# Patient Record
Sex: MALE | Race: OTHER | NOT HISPANIC OR LATINO
[De-identification: names, ages, dates, MRNs, and addresses within clinical notes are randomized per-mention and may not be internally consistent; named-entity substitution may affect disease eponyms.]

---

## 2017-03-23 ENCOUNTER — NON-APPOINTMENT (OUTPATIENT)
Age: 62
End: 2017-03-23

## 2017-03-23 ENCOUNTER — APPOINTMENT (OUTPATIENT)
Dept: CARDIOLOGY | Facility: CLINIC | Age: 62
End: 2017-03-23

## 2017-03-23 VITALS — DIASTOLIC BLOOD PRESSURE: 91 MMHG | WEIGHT: 240 LBS | SYSTOLIC BLOOD PRESSURE: 159 MMHG

## 2017-08-03 ENCOUNTER — NON-APPOINTMENT (OUTPATIENT)
Age: 62
End: 2017-08-03

## 2017-08-03 ENCOUNTER — APPOINTMENT (OUTPATIENT)
Dept: CARDIOLOGY | Facility: CLINIC | Age: 62
End: 2017-08-03
Payer: MEDICARE

## 2017-08-03 VITALS — DIASTOLIC BLOOD PRESSURE: 82 MMHG | SYSTOLIC BLOOD PRESSURE: 142 MMHG | OXYGEN SATURATION: 94 % | HEART RATE: 66 BPM

## 2017-08-03 PROCEDURE — 99215 OFFICE O/P EST HI 40 MIN: CPT

## 2017-08-03 PROCEDURE — 93000 ELECTROCARDIOGRAM COMPLETE: CPT

## 2017-12-28 ENCOUNTER — APPOINTMENT (OUTPATIENT)
Dept: CARDIOLOGY | Facility: CLINIC | Age: 62
End: 2017-12-28

## 2018-01-25 ENCOUNTER — NON-APPOINTMENT (OUTPATIENT)
Age: 63
End: 2018-01-25

## 2018-01-25 ENCOUNTER — APPOINTMENT (OUTPATIENT)
Dept: CARDIOLOGY | Facility: CLINIC | Age: 63
End: 2018-01-25
Payer: MEDICARE

## 2018-01-25 VITALS
HEIGHT: 70.5 IN | SYSTOLIC BLOOD PRESSURE: 143 MMHG | DIASTOLIC BLOOD PRESSURE: 83 MMHG | HEART RATE: 70 BPM | BODY MASS INDEX: 33.98 KG/M2 | OXYGEN SATURATION: 97 % | WEIGHT: 240 LBS

## 2018-01-25 PROCEDURE — 93000 ELECTROCARDIOGRAM COMPLETE: CPT

## 2018-01-25 PROCEDURE — 99214 OFFICE O/P EST MOD 30 MIN: CPT

## 2018-02-12 ENCOUNTER — MEDICATION RENEWAL (OUTPATIENT)
Age: 63
End: 2018-02-12

## 2018-02-12 ENCOUNTER — RX RENEWAL (OUTPATIENT)
Age: 63
End: 2018-02-12

## 2018-08-09 ENCOUNTER — MEDICATION RENEWAL (OUTPATIENT)
Age: 63
End: 2018-08-09

## 2018-08-09 ENCOUNTER — NON-APPOINTMENT (OUTPATIENT)
Age: 63
End: 2018-08-09

## 2018-08-09 ENCOUNTER — APPOINTMENT (OUTPATIENT)
Dept: CARDIOLOGY | Facility: CLINIC | Age: 63
End: 2018-08-09
Payer: MEDICARE

## 2018-08-09 VITALS
DIASTOLIC BLOOD PRESSURE: 83 MMHG | BODY MASS INDEX: 36.81 KG/M2 | HEART RATE: 75 BPM | WEIGHT: 260 LBS | OXYGEN SATURATION: 95 % | HEIGHT: 70.5 IN | SYSTOLIC BLOOD PRESSURE: 144 MMHG

## 2018-08-09 PROCEDURE — 93000 ELECTROCARDIOGRAM COMPLETE: CPT

## 2018-08-09 PROCEDURE — 99214 OFFICE O/P EST MOD 30 MIN: CPT

## 2018-11-15 ENCOUNTER — NON-APPOINTMENT (OUTPATIENT)
Age: 63
End: 2018-11-15

## 2018-11-15 ENCOUNTER — APPOINTMENT (OUTPATIENT)
Dept: CARDIOLOGY | Facility: CLINIC | Age: 63
End: 2018-11-15
Payer: MEDICARE

## 2018-11-15 VITALS — DIASTOLIC BLOOD PRESSURE: 91 MMHG | HEIGHT: 70.5 IN | SYSTOLIC BLOOD PRESSURE: 149 MMHG

## 2018-11-15 PROCEDURE — 93000 ELECTROCARDIOGRAM COMPLETE: CPT

## 2018-11-15 PROCEDURE — 99214 OFFICE O/P EST MOD 30 MIN: CPT

## 2018-11-15 RX ORDER — SENNOSIDES 8.6 MG/1
8.6 TABLET ORAL
Qty: 180 | Refills: 0 | Status: ACTIVE | COMMUNITY
Start: 2017-12-28

## 2018-11-15 RX ORDER — DOCUSATE SODIUM 100 MG/1
100 CAPSULE ORAL
Refills: 0 | Status: ACTIVE | COMMUNITY

## 2018-11-15 RX ORDER — MULTIVIT-MIN/FA/LYCOPEN/LUTEIN .4-300-25
TABLET ORAL
Refills: 0 | Status: ACTIVE | COMMUNITY

## 2018-11-15 RX ORDER — DULOXETINE HYDROCHLORIDE 60 MG/1
60 CAPSULE, DELAYED RELEASE PELLETS ORAL
Qty: 60 | Refills: 0 | Status: ACTIVE | COMMUNITY
Start: 2018-07-07

## 2018-11-15 RX ORDER — OXCARBAZEPINE 150 MG/1
150 TABLET, FILM COATED ORAL TWICE DAILY
Refills: 0 | Status: ACTIVE | COMMUNITY
Start: 2018-07-07

## 2018-11-15 RX ORDER — ARIPIPRAZOLE 2 MG/1
2 TABLET ORAL
Refills: 0 | Status: DISCONTINUED | COMMUNITY
Start: 2018-08-09 | End: 2018-11-15

## 2018-11-18 NOTE — REASON FOR VISIT
[Follow-Up - Clinic] : a clinic follow-up of [Coronary Artery Disease] : coronary artery disease [Hyperlipidemia] : hyperlipidemia [Hypertension] : hypertension [Medication Management] : Medication management [Prior Myocardial Infarction] : a prior myocardial infarction [FreeTextEntry1] : prior CVA

## 2018-11-18 NOTE — HISTORY OF PRESENT ILLNESS
[FreeTextEntry1] : Isaak is returning to the office for a follow-up\par Appears more engaged and happy than prior\par Was recently admitted to [facility] for cardiac w/u which was unrevealing.\par \par No chest pain\par No palpitations\par No LE edema\par No falls or blackouts\par \par

## 2018-11-18 NOTE — PHYSICAL EXAM
[General Appearance - Well Developed] : well developed [Normal Appearance] : normal appearance [General Appearance - Well Nourished] : well nourished [General Appearance - In No Acute Distress] : no acute distress [Normal Conjunctiva] : the conjunctiva exhibited no abnormalities [Eyelids - No Xanthelasma] : the eyelids demonstrated no xanthelasmas [Normal Oral Mucosa] : normal oral mucosa [No Oral Pallor] : no oral pallor [No Oral Cyanosis] : no oral cyanosis [Normal Jugular Venous A Waves Present] : normal jugular venous A waves present [Normal Jugular Venous V Waves Present] : normal jugular venous V waves present [No Jugular Venous Carrillo A Waves] : no jugular venous carrillo A waves [Respiration, Rhythm And Depth] : normal respiratory rhythm and effort [Exaggerated Use Of Accessory Muscles For Inspiration] : no accessory muscle use [Auscultation Breath Sounds / Voice Sounds] : lungs were clear to auscultation bilaterally [Heart Rate And Rhythm] : heart rate and rhythm were normal [Heart Sounds] : normal S1 and S2 [Murmurs] : no murmurs present [Abdomen Soft] : soft [Abdomen Tenderness] : non-tender [Abdomen Mass (___ Cm)] : no abdominal mass palpated [Nail Clubbing] : no clubbing of the fingernails [Cyanosis, Localized] : no localized cyanosis [Skin Color & Pigmentation] : normal skin color and pigmentation [] : no rash [No Venous Stasis] : no venous stasis [Skin Lesions] : no skin lesions [No Skin Ulcers] : no skin ulcer [No Xanthoma] : no  xanthoma was observed [Oriented To Time, Place, And Person] : oriented to person, place, and time [No Anxiety] : not feeling anxious [FreeTextEntry1] : Sitting in wheelchair

## 2019-01-10 ENCOUNTER — NON-APPOINTMENT (OUTPATIENT)
Age: 64
End: 2019-01-10

## 2019-01-10 ENCOUNTER — APPOINTMENT (OUTPATIENT)
Dept: CARDIOLOGY | Facility: CLINIC | Age: 64
End: 2019-01-10
Payer: MEDICARE

## 2019-01-10 VITALS — WEIGHT: 230 LBS | DIASTOLIC BLOOD PRESSURE: 76 MMHG | BODY MASS INDEX: 32.54 KG/M2 | SYSTOLIC BLOOD PRESSURE: 126 MMHG

## 2019-01-10 PROCEDURE — 93000 ELECTROCARDIOGRAM COMPLETE: CPT

## 2019-01-10 PROCEDURE — 99214 OFFICE O/P EST MOD 30 MIN: CPT

## 2019-01-13 NOTE — HISTORY OF PRESENT ILLNESS
[FreeTextEntry1] : Isaak is returning to the office for a follow-up\par Recently admitted to [facility] with urosepsis/kidney stone\par Now returns for f/u\par \par No chest pain\par No palpitations\par No LE edema\par No falls or blackouts\par \par

## 2019-05-16 ENCOUNTER — APPOINTMENT (OUTPATIENT)
Dept: CARDIOLOGY | Facility: CLINIC | Age: 64
End: 2019-05-16

## 2019-07-11 ENCOUNTER — NON-APPOINTMENT (OUTPATIENT)
Age: 64
End: 2019-07-11

## 2019-07-11 ENCOUNTER — APPOINTMENT (OUTPATIENT)
Dept: CARDIOLOGY | Facility: CLINIC | Age: 64
End: 2019-07-11
Payer: MEDICARE

## 2019-07-11 VITALS — SYSTOLIC BLOOD PRESSURE: 109 MMHG | OXYGEN SATURATION: 95 % | DIASTOLIC BLOOD PRESSURE: 73 MMHG | HEART RATE: 86 BPM

## 2019-07-11 PROCEDURE — 93000 ELECTROCARDIOGRAM COMPLETE: CPT

## 2019-07-11 PROCEDURE — 99214 OFFICE O/P EST MOD 30 MIN: CPT

## 2019-07-22 NOTE — PHYSICAL EXAM
[General Appearance - Well Developed] : well developed [Normal Appearance] : normal appearance [General Appearance - Well Nourished] : well nourished [General Appearance - In No Acute Distress] : no acute distress [Normal Conjunctiva] : the conjunctiva exhibited no abnormalities [Eyelids - No Xanthelasma] : the eyelids demonstrated no xanthelasmas [Normal Oral Mucosa] : normal oral mucosa [No Oral Pallor] : no oral pallor [No Oral Cyanosis] : no oral cyanosis [Normal Jugular Venous A Waves Present] : normal jugular venous A waves present [Normal Jugular Venous V Waves Present] : normal jugular venous V waves present [No Jugular Venous Carrillo A Waves] : no jugular venous carrillo A waves [Respiration, Rhythm And Depth] : normal respiratory rhythm and effort [Auscultation Breath Sounds / Voice Sounds] : lungs were clear to auscultation bilaterally [Exaggerated Use Of Accessory Muscles For Inspiration] : no accessory muscle use [Heart Rate And Rhythm] : heart rate and rhythm were normal [Heart Sounds] : normal S1 and S2 [Murmurs] : no murmurs present [Abdomen Soft] : soft [Abdomen Tenderness] : non-tender [Abdomen Mass (___ Cm)] : no abdominal mass palpated [Cyanosis, Localized] : no localized cyanosis [Nail Clubbing] : no clubbing of the fingernails [Skin Color & Pigmentation] : normal skin color and pigmentation [] : no rash [No Venous Stasis] : no venous stasis [Skin Lesions] : no skin lesions [No Skin Ulcers] : no skin ulcer [No Xanthoma] : no  xanthoma was observed [Oriented To Time, Place, And Person] : oriented to person, place, and time [No Anxiety] : not feeling anxious [FreeTextEntry1] : Sitting in wheelchair

## 2019-07-22 NOTE — REASON FOR VISIT
[Follow-Up - Clinic] : a clinic follow-up of [Coronary Artery Disease] : coronary artery disease [Hyperlipidemia] : hyperlipidemia [Medication Management] : Medication management [Hypertension] : hypertension [Prior Myocardial Infarction] : a prior myocardial infarction [FreeTextEntry1] : prior CVA

## 2019-07-25 ENCOUNTER — APPOINTMENT (OUTPATIENT)
Dept: CV DIAGNOSTICS | Facility: HOSPITAL | Age: 64
End: 2019-07-25

## 2019-08-05 ENCOUNTER — APPOINTMENT (OUTPATIENT)
Dept: CV DIAGNOSTICS | Facility: HOSPITAL | Age: 64
End: 2019-08-05

## 2019-08-05 ENCOUNTER — OUTPATIENT (OUTPATIENT)
Dept: OUTPATIENT SERVICES | Facility: HOSPITAL | Age: 64
LOS: 1 days | End: 2019-08-05
Payer: MEDICARE

## 2019-08-05 DIAGNOSIS — I25.10 ATHEROSCLEROTIC HEART DISEASE OF NATIVE CORONARY ARTERY WITHOUT ANGINA PECTORIS: ICD-10-CM

## 2019-08-05 PROCEDURE — 78452 HT MUSCLE IMAGE SPECT MULT: CPT

## 2019-08-05 PROCEDURE — A9500: CPT

## 2019-08-05 PROCEDURE — 93018 CV STRESS TEST I&R ONLY: CPT

## 2019-08-05 PROCEDURE — 93016 CV STRESS TEST SUPVJ ONLY: CPT

## 2019-08-05 PROCEDURE — 93017 CV STRESS TEST TRACING ONLY: CPT

## 2019-08-05 PROCEDURE — 78452 HT MUSCLE IMAGE SPECT MULT: CPT | Mod: 26

## 2019-08-16 ENCOUNTER — INPATIENT (INPATIENT)
Facility: HOSPITAL | Age: 64
LOS: 9 days | Discharge: HOME | DRG: 286 | End: 2019-08-26
Attending: INTERNAL MEDICINE | Admitting: INTERNAL MEDICINE
Payer: MEDICARE

## 2019-08-16 VITALS
OXYGEN SATURATION: 100 % | SYSTOLIC BLOOD PRESSURE: 117 MMHG | DIASTOLIC BLOOD PRESSURE: 70 MMHG | HEART RATE: 74 BPM | WEIGHT: 199.96 LBS | TEMPERATURE: 98 F | HEIGHT: 71 IN | RESPIRATION RATE: 18 BRPM

## 2019-08-16 DIAGNOSIS — I21.4 NON-ST ELEVATION (NSTEMI) MYOCARDIAL INFARCTION: ICD-10-CM

## 2019-08-16 LAB — GLUCOSE BLDC GLUCOMTR-MCNC: 110 MG/DL — HIGH (ref 70–99)

## 2019-08-16 PROCEDURE — 71045 X-RAY EXAM CHEST 1 VIEW: CPT | Mod: 26

## 2019-08-16 PROCEDURE — 93010 ELECTROCARDIOGRAM REPORT: CPT

## 2019-08-16 RX ORDER — DOCUSATE SODIUM 100 MG
100 CAPSULE ORAL DAILY
Refills: 0 | Status: DISCONTINUED | OUTPATIENT
Start: 2019-08-16 | End: 2019-08-26

## 2019-08-16 RX ORDER — LISINOPRIL 2.5 MG/1
5 TABLET ORAL DAILY
Refills: 0 | Status: DISCONTINUED | OUTPATIENT
Start: 2019-08-16 | End: 2019-08-17

## 2019-08-16 RX ORDER — DULOXETINE HYDROCHLORIDE 30 MG/1
2 CAPSULE, DELAYED RELEASE ORAL
Qty: 0 | Refills: 0 | DISCHARGE

## 2019-08-16 RX ORDER — MULTIVIT-MIN/FERROUS GLUCONATE 9 MG/15 ML
1 LIQUID (ML) ORAL DAILY
Refills: 0 | Status: DISCONTINUED | OUTPATIENT
Start: 2019-08-16 | End: 2019-08-26

## 2019-08-16 RX ORDER — DOCUSATE SODIUM 100 MG
3 CAPSULE ORAL
Qty: 0 | Refills: 0 | DISCHARGE

## 2019-08-16 RX ORDER — CLONAZEPAM 1 MG
2 TABLET ORAL AT BEDTIME
Refills: 0 | Status: DISCONTINUED | OUTPATIENT
Start: 2019-08-16 | End: 2019-08-23

## 2019-08-16 RX ORDER — CLOPIDOGREL BISULFATE 75 MG/1
75 TABLET, FILM COATED ORAL DAILY
Refills: 0 | Status: DISCONTINUED | OUTPATIENT
Start: 2019-08-16 | End: 2019-08-26

## 2019-08-16 RX ORDER — SENNA PLUS 8.6 MG/1
1 TABLET ORAL AT BEDTIME
Refills: 0 | Status: DISCONTINUED | OUTPATIENT
Start: 2019-08-16 | End: 2019-08-26

## 2019-08-16 RX ORDER — BUPROPION HYDROCHLORIDE 150 MG/1
1 TABLET, EXTENDED RELEASE ORAL
Qty: 0 | Refills: 0 | DISCHARGE

## 2019-08-16 RX ORDER — ATORVASTATIN CALCIUM 80 MG/1
80 TABLET, FILM COATED ORAL AT BEDTIME
Refills: 0 | Status: DISCONTINUED | OUTPATIENT
Start: 2019-08-16 | End: 2019-08-26

## 2019-08-16 RX ORDER — ARIPIPRAZOLE 15 MG/1
5 TABLET ORAL DAILY
Refills: 0 | Status: DISCONTINUED | OUTPATIENT
Start: 2019-08-16 | End: 2019-08-26

## 2019-08-16 RX ORDER — OXCARBAZEPINE 300 MG/1
150 TABLET, FILM COATED ORAL
Refills: 0 | Status: DISCONTINUED | OUTPATIENT
Start: 2019-08-16 | End: 2019-08-26

## 2019-08-16 RX ORDER — SODIUM CHLORIDE 9 MG/ML
3 INJECTION INTRAMUSCULAR; INTRAVENOUS; SUBCUTANEOUS EVERY 8 HOURS
Refills: 0 | Status: DISCONTINUED | OUTPATIENT
Start: 2019-08-16 | End: 2019-08-26

## 2019-08-16 RX ORDER — ASPIRIN/CALCIUM CARB/MAGNESIUM 324 MG
81 TABLET ORAL DAILY
Refills: 0 | Status: DISCONTINUED | OUTPATIENT
Start: 2019-08-16 | End: 2019-08-21

## 2019-08-16 RX ORDER — AMLODIPINE BESYLATE 2.5 MG/1
5 TABLET ORAL DAILY
Refills: 0 | Status: DISCONTINUED | OUTPATIENT
Start: 2019-08-16 | End: 2019-08-17

## 2019-08-16 RX ORDER — OXCARBAZEPINE 300 MG/1
1 TABLET, FILM COATED ORAL
Qty: 0 | Refills: 0 | DISCHARGE

## 2019-08-16 RX ORDER — TAMSULOSIN HYDROCHLORIDE 0.4 MG/1
1 CAPSULE ORAL
Qty: 0 | Refills: 0 | DISCHARGE

## 2019-08-16 RX ORDER — SENNA PLUS 8.6 MG/1
1 TABLET ORAL
Qty: 0 | Refills: 0 | DISCHARGE

## 2019-08-16 RX ORDER — TAMSULOSIN HYDROCHLORIDE 0.4 MG/1
0.4 CAPSULE ORAL AT BEDTIME
Refills: 0 | Status: DISCONTINUED | OUTPATIENT
Start: 2019-08-16 | End: 2019-08-26

## 2019-08-16 RX ORDER — METOPROLOL TARTRATE 50 MG
50 TABLET ORAL
Refills: 0 | Status: DISCONTINUED | OUTPATIENT
Start: 2019-08-16 | End: 2019-08-18

## 2019-08-16 RX ORDER — FINASTERIDE 5 MG/1
1 TABLET, FILM COATED ORAL
Qty: 0 | Refills: 0 | DISCHARGE

## 2019-08-16 RX ORDER — FAMOTIDINE 10 MG/ML
40 INJECTION INTRAVENOUS DAILY
Refills: 0 | Status: DISCONTINUED | OUTPATIENT
Start: 2019-08-16 | End: 2019-08-26

## 2019-08-16 RX ORDER — ARIPIPRAZOLE 15 MG/1
1 TABLET ORAL
Qty: 0 | Refills: 0 | DISCHARGE

## 2019-08-16 RX ORDER — DULOXETINE HYDROCHLORIDE 30 MG/1
60 CAPSULE, DELAYED RELEASE ORAL DAILY
Refills: 0 | Status: DISCONTINUED | OUTPATIENT
Start: 2019-08-16 | End: 2019-08-26

## 2019-08-16 RX ORDER — FINASTERIDE 5 MG/1
5 TABLET, FILM COATED ORAL DAILY
Refills: 0 | Status: DISCONTINUED | OUTPATIENT
Start: 2019-08-16 | End: 2019-08-26

## 2019-08-16 RX ORDER — BUPROPION HYDROCHLORIDE 150 MG/1
300 TABLET, EXTENDED RELEASE ORAL DAILY
Refills: 0 | Status: DISCONTINUED | OUTPATIENT
Start: 2019-08-16 | End: 2019-08-26

## 2019-08-16 RX ADMIN — ATORVASTATIN CALCIUM 80 MILLIGRAM(S): 80 TABLET, FILM COATED ORAL at 21:45

## 2019-08-16 RX ADMIN — OXCARBAZEPINE 150 MILLIGRAM(S): 300 TABLET, FILM COATED ORAL at 23:02

## 2019-08-16 RX ADMIN — SENNA PLUS 1 TABLET(S): 8.6 TABLET ORAL at 21:44

## 2019-08-16 RX ADMIN — Medication 2 MILLIGRAM(S): at 22:54

## 2019-08-16 RX ADMIN — Medication 50 MILLIGRAM(S): at 21:44

## 2019-08-16 RX ADMIN — TAMSULOSIN HYDROCHLORIDE 0.4 MILLIGRAM(S): 0.4 CAPSULE ORAL at 21:45

## 2019-08-16 RX ADMIN — SODIUM CHLORIDE 3 MILLILITER(S): 9 INJECTION INTRAMUSCULAR; INTRAVENOUS; SUBCUTANEOUS at 22:00

## 2019-08-16 NOTE — H&P CARDIOLOGY - FAMILY HISTORY
Sibling  Still living? Yes, Estimated age: Age Unknown  Family history of pacemaker, Age at diagnosis: Age Unknown  Atrial fibrillation, Age at diagnosis: Age Unknown

## 2019-08-16 NOTE — CHART NOTE - NSCHARTNOTEFT_GEN_A_CORE
62 yo m significant Cardiac Hx  CHF exacerbation, UTI/PNA admitted on 8/8 [facility]. Tx'ed for cath. Trop 0.36-0.38 BNP>10K. A course of ABX completed 8/15, appear euvolemic.   EF 10% by Echo, by stress 8/5/19 ([facility]) was 14% (seen by Dr. Padron), Cath Cx'ed 2/2 no significant stress test changes as per Dr. Becker.  + stage 3-4 sacral decubitus f/u with wound clinic at [place].   Chronic Pro cath switched at [place] by urology team    Admitted to tele (Dr. Moran as per Dr. Becker)  Card Consult team, Heart failure team, PT/wound care team to follow up.   Continue current medication: ASA, Plavix, Statin, Metoprolol  Consider restart diuretics  Repeat Echo if needed 62 yo m significant Cardiac Hx  CHF exacerbation, UTI/PNA admitted on 8/8 [facility]. Tx'ed for cath. Trop 0.36-0.38 BNP>10K. A course of ABX completed 8/15, appear euvolemic.   EF 10% by Echo, by stress 8/5/19 ([facility]) was 14% (seen by Dr. Padron), Cath Cx'ed 2/2 no significant stress test changes as per Dr. Becker.  + stage 3-4 sacral decubitus f/u with wound clinic at [place].   Chronic Pro cath switched at [place] by urology team    Admitted to tele (Dr. Moran as per Dr. Becker)  Card Consult team, Heart failure team, PT/wound care team to follow up.   Continue current medication: ASA, Plavix, Statin, Metoprolol  Consider restart diuretics  Repeat Echo if needed  Urology consult

## 2019-08-16 NOTE — H&P CARDIOLOGY - PMH
CAD (Coronary Artery Disease)    Congestive heart failure    CVA (Cerebral Infarction)    Depression    Diabetes    Hypercholesteremia    Hypertension    Myocardial Infarction    Obesity    Sacral decubitus ulcer, stage III  to IV Benign prostatic hypertrophy    CAD (Coronary Artery Disease)    Congestive heart failure    CVA (Cerebral Infarction)    Depression    Diabetes    Hypercholesteremia    Hypertension    Myocardial Infarction    Obesity    Sacral decubitus ulcer, stage III  to IV

## 2019-08-16 NOTE — H&P CARDIOLOGY - ADDITIONAL PE
sacral stage 3-4 decubitus size 3x5 cm with depth 2cm, appearing tunnel on the bottom mild green drainage noted.

## 2019-08-16 NOTE — H&P CARDIOLOGY - HISTORY OF PRESENT ILLNESS
63 year old  male no implantable device  with PMHx: HTN, DMT2, CAD, MI followed by CABG X 4 (2008), Stent x 2 in 2011, CVA with right side weakness and tremors, HTN, HLD, stage IV      vessels 3 years ago complicated by  CVA  that required tracheostomy, presents to [facility] [place] with 2 days history of back and chest pain 4/10 at rest midsternal area, + diaphoresis. Patient ruled in for NSTEMI, trop 20.5,  started on heparin gtt and tridal gtt and transferred here for cardiac catheterization,?PCI. 63 year old  male no implantable device with PMHx: HTN, DMT2 (long time ago as pre daughter, not on meds), CAD, MI followed by CABG X 4 (2007), Stent x 2 in 2011, CVA with right side weakness and tremors, HTN, HLD, stage IV sacral decubitus presented to [facility] on 8/8/2019 c/o SOB, no chest pain, no dizziness or syncopal episode. Pt was placed on BIPAP and now on 3L/NC.   Pt usually walks with walker in house (wheel chair and mobilized wheel chair but incontinent to urine and stool, Pro cath for past few months f/u urology clinic at [place]). Pt had mild elevation of Troponin 0.38 from 0.36. Treated for CHF exacerbation and possible pneumonia with a course of ABX.    EF was decreased to 10% now from 15% as per pt's daughter.     [facility] Urology clinic: [phone],   PCP  Sergey Wassem , [phone], Psychiatrist Dr. Shara Muse , [phone] (fax) 63 year old  male no implantable device with PMHx: HTN, DMT2 (long time ago as pre daughter, not on meds), CAD, MI followed by CABG X 4 (2007), Stent x 2 in 2011, CVA with right side weakness and tremors, HTN, HLD, stage III- IV sacral decubitus presented to [facility] on 8/8/2019 c/o SOB, no chest pain, no dizziness or syncopal episode. CXR showed pulm congestion with prBNP 10k, WBC 18, was placed on BIPAP admitted to CCU for CHF excerbation, diuresed, and now on 3L/NC. Had mild elevation of Troponin 0.38 from 0.36. On admission pt had bilateral crackles with JVD as per record. Possible PNA & UA positive for 3 organism, a course of ABX (Rocephin and Zithromax) completed on 8/15.   EF 10% on this admission compare to last EF 14% by stress test. Pro cath changed on 8/12 by urologist, Urine C/s positive for E coli, Proteus Mirabillis, sensitive to ceftriaxone. Repeat urine C/s pending as per note.   Having decrease EF with mild elevation of Troponin, pt is now transferred here for cardiac cath and further treatment options.     Pt usually walks with walker in house (wheel chair and mobilized wheel chair but incontinent to urine and stool, Por cath for past few months f/u urology clinic at [place]).     Echo on 8/8/2019: mod dilated LV size, normal RV systolic function, mod dilated left atrium EF 10%.     [facility] Urology clinic: [phone],   PCP  Sergey Wassem , [phone], Psychiatrist Dr. Shara Muse , [phone] (fax)  Labs on  8/15 WBC 5.59, H/H 10/33, plt 207, , K 4.4, chl 103, CO2 28, BUN 23, Cr 1.25 63 year old  male no implantable device with PMHx: HTN, DMT2 (long time ago as pre daughter, not on meds), CAD, MI followed by CABG X 4 (2007), Stent x 2 in 2011, CVA with right side weakness and tremors, HTN, HLD, stage III- IV sacral decubitus presented to [facility] on 8/8/2019 c/o SOB, no chest pain, no dizziness or syncopal episode. CXR showed pulm congestion with prBNP 10k, WBC 18, was placed on BIPAP admitted to CCU for CHF excerbation, diuresed, and now on 3L/NC. Had mild elevation of Troponin 0.38 from 0.36. On admission pt had bilateral crackles with JVD as per record. Possible PNA & UA positive for 3 organism, a course of ABX (Rocephin and Zithromax) completed on 8/15.   EF 10% on this admission compare to last EF 14% by stress test. Pro cath changed on 8/12 by urologist, Urine C/s positive for E coli, Proteus Mirabillis, sensitive to ceftriaxone. Repeat urine C/s pending as per note.   Having decrease EF with mild elevation of Troponin, pt is now transferred here for cardiac cath and further treatment options.   Last stress test on 8/5/2019:   < from: Nuclear Stress Test-Pharmacologic (08.05.19 @ 17:04) >  * Baseline ECG: There were up to 1 mm downsloping ST segment depressions and T wave inversions in leads II, III, aVF, and V4-V6 at baseline.  * ECG Changes: Baseline ECG abnormalities persisted during rest, stress, and recovery. * Arrhythmia: Occasional VPDs occurred during rest, stress and recovery.  * The left ventricle was enlarged. There are large, moderate to severe defects in the anterior, anterolateral, basal and distal anteroseptal, and apical walls that are mostly fixed suggestive of infarct with mild paulo-infarct ischemia.  * There are large, severe defects in the inferior, inferolateral, and basal and distal inferoseptal walls that are mostly fixed suggestive of infarct with mild paulo-infarct ischemia. * Post-stress gated wall motion analysis was performed (LVEF = 14 %;LVEDV = 346 ml.) Severe global LV systolic dysfunction.   < end of copied text >  Pt usually walks with walker in house (wheel chair and mobilized wheel chair but incontinent to urine and stool, Pro cath for past few months f/u urology clinic at [place]).     Echo on 8/8/2019: mod dilated LV size, normal RV systolic function, mod dilated left atrium EF 10%.     [facility] Urology clinic: [phone],   PCP  Sergey Wassem , [phone], Psychiatrist Dr. Shara Muse , [phone] (fax)  Labs on  8/15 WBC 5.59, H/H 10/33, plt 207, , K 4.4, chl 103, CO2 28, BUN 23, Cr 1.25 63 year old  male no implantable device with PMHx: HTN, DMT2 (long time ago as pre daughter, not on meds, last A1C in 2016 was 5.6), CAD, MI followed by CABG X 4 (2007), Stent x 2 in 2011, CVA with right side weakness and tremors, HTN, HLD, stage III- IV sacral decubitus presented to [facility] on 8/8/2019 c/o SOB, no chest pain, no dizziness or syncopal episode. CXR showed pulm congestion with prBNP 10k, WBC 18, was placed on BIPAP admitted to CCU for CHF exacerbation diuresed, and now on 3L/NC. Had mild elevation of Troponin 0.38 from 0.36. On admission PE, pt had bilateral crackles with JVD as per record. For possible pnemonia & UTI (positive for 3 organism), a course of ABX (Rocephin and Zithromax) completed on 8/15. EF 10% on this admission compare to last EF 14% by stress test on 8/5/2019 at [facility].  Pro cath changed on 8/12 by urologist, Urine C/s resulted positive for E coli, Proteus Mirabillis, sensitive to ceftriaxone. Repeat urine C/s pending as per note. Having decrease EF with mild elevation of Troponin, pt is now transferred here for cardiac cath and further treatment options.     Last stress test on 8/5/2019, last seen by Dr. Padron was 7/11/2019.    < from: Nuclear Stress Test-Pharmacologic (08.05.19 @ 17:04) >  * Baseline ECG: There were up to 1 mm downsloping ST segment depressions and T wave inversions in leads II, III, aVF, and V4-V6 at baseline.  * ECG Changes: Baseline ECG abnormalities persisted during rest, stress, and recovery. * Arrhythmia: Occasional VPDs occurred during rest, stress and recovery.  * The left ventricle was enlarged. There are large, moderate to severe defects in the anterior, anterolateral, basal and distal anteroseptal, and apical walls that are mostly fixed suggestive of infarct with mild paulo-infarct ischemia.  * There are large, severe defects in the inferior, inferolateral, and basal and distal inferoseptal walls that are mostly fixed suggestive of infarct with mild paulo-infarct ischemia. * Post-stress gated wall motion analysis was performed (LVEF = 14 %;LVEDV = 346 ml.) Severe global LV systolic dysfunction.   < end of copied text >  Pt usually walks with walker in house (wheel chair and mobilized wheel chair but incontinent to urine and stool, Pro cath for past few months f/u urology clinic at [place]).     Echo on 8/8/2019: mod dilated LV size, normal RV systolic function, mod dilated left atrium EF 10%.     [facility] Urology clinic: [phone],   PCP visiting doctor Sergey Flores , [phone], Psychiatrist Dr. Shara Muse , [phone] (fax)  Labs on  8/15 WBC 5.59, H/H 10/33, plt 207, , K 4.4, chl 103, CO2 28, BUN 23, Cr 1.25 63 year old  male no implantable device with PMHx: HTN, DMT2 (not on meds, last A1C in 2016 was 5.6), CAD, MI followed by CABG X 4 (2007), Stent x 2 in 2011, CVA with right side weakness and tremors, HTN, HLD, stage III- IV sacral decubitus presented to [facility] on 8/8/2019 c/o SOB, no chest pain, no dizziness or syncopal episode. CXR showed pulm congestion with prBNP 10k, WBC 18, was placed on BIPAP admitted to CCU for CHF exacerbation diuresed, and now on 3L/NC. Had mild elevation of Troponin 0.38 from 0.36. On admission PE, pt had bilateral crackles with JVD as per record. For possible pnemonia & UTI (positive for 3 organism), a course of ABX (Rocephin and Zithromax) completed on 8/15. EF 10% on this admission compare to last EF 14% by stress test on 8/5/2019 at [facility].  Pro cath changed on 8/12 by urologist, Urine C/s resulted positive for E coli, Proteus Mirabillis, sensitive to ceftriaxone. Repeat urine C/s pending as per note. Having decrease EF with mild elevation of Troponin, pt is now transferred here for cardiac cath and further treatment options.     Last stress test on 8/5/2019, last seen by Dr. Padron was 7/11/2019.    < from: Nuclear Stress Test-Pharmacologic (08.05.19 @ 17:04) >  * Baseline ECG: There were up to 1 mm downsloping ST segment depressions and T wave inversions in leads II, III, aVF, and V4-V6 at baseline.  * ECG Changes: Baseline ECG abnormalities persisted during rest, stress, and recovery. * Arrhythmia: Occasional VPDs occurred during rest, stress and recovery.  * The left ventricle was enlarged. There are large, moderate to severe defects in the anterior, anterolateral, basal and distal anteroseptal, and apical walls that are mostly fixed suggestive of infarct with mild paulo-infarct ischemia.  * There are large, severe defects in the inferior, inferolateral, and basal and distal inferoseptal walls that are mostly fixed suggestive of infarct with mild paulo-infarct ischemia. * Post-stress gated wall motion analysis was performed (LVEF = 14 %;LVEDV = 346 ml.) Severe global LV systolic dysfunction.   < end of copied text >  Pt usually walks with walker in house (wheel chair and mobilized wheel chair but incontinent to urine and stool, Pro cath for past few months f/u urology clinic at [place]).     Echo on 8/8/2019: mod dilated LV size, normal RV systolic function, mod dilated left atrium EF 10%.     [facility] Urology clinic: [phone],   PCP visiting doctor Sergey Flores , [phone], Psychiatrist Dr. Shara Muse , [phone] (fax)  Labs on  8/15 WBC 5.59, H/H 10/33, plt 207, , K 4.4, chl 103, CO2 28, BUN 23, Cr 1.25

## 2019-08-17 DIAGNOSIS — I63.9 CEREBRAL INFARCTION, UNSPECIFIED: ICD-10-CM

## 2019-08-17 DIAGNOSIS — I50.23 ACUTE ON CHRONIC SYSTOLIC (CONGESTIVE) HEART FAILURE: ICD-10-CM

## 2019-08-17 DIAGNOSIS — I25.10 ATHEROSCLEROTIC HEART DISEASE OF NATIVE CORONARY ARTERY WITHOUT ANGINA PECTORIS: ICD-10-CM

## 2019-08-17 DIAGNOSIS — E78.00 PURE HYPERCHOLESTEROLEMIA, UNSPECIFIED: ICD-10-CM

## 2019-08-17 DIAGNOSIS — R33.9 RETENTION OF URINE, UNSPECIFIED: ICD-10-CM

## 2019-08-17 DIAGNOSIS — F32.9 MAJOR DEPRESSIVE DISORDER, SINGLE EPISODE, UNSPECIFIED: ICD-10-CM

## 2019-08-17 DIAGNOSIS — I10 ESSENTIAL (PRIMARY) HYPERTENSION: ICD-10-CM

## 2019-08-17 DIAGNOSIS — L89.153 PRESSURE ULCER OF SACRAL REGION, STAGE 3: ICD-10-CM

## 2019-08-17 LAB
ANION GAP SERPL CALC-SCNC: 9 MMOL/L — SIGNIFICANT CHANGE UP (ref 5–17)
BUN SERPL-MCNC: 20 MG/DL — SIGNIFICANT CHANGE UP (ref 7–23)
CALCIUM SERPL-MCNC: 9 MG/DL — SIGNIFICANT CHANGE UP (ref 8.4–10.5)
CHLORIDE SERPL-SCNC: 104 MMOL/L — SIGNIFICANT CHANGE UP (ref 96–108)
CO2 SERPL-SCNC: 26 MMOL/L — SIGNIFICANT CHANGE UP (ref 22–31)
CREAT SERPL-MCNC: 1.29 MG/DL — SIGNIFICANT CHANGE UP (ref 0.5–1.3)
GLUCOSE BLDC GLUCOMTR-MCNC: 78 MG/DL — SIGNIFICANT CHANGE UP (ref 70–99)
GLUCOSE SERPL-MCNC: 83 MG/DL — SIGNIFICANT CHANGE UP (ref 70–99)
HBA1C BLD-MCNC: 5.2 % — SIGNIFICANT CHANGE UP (ref 4–5.6)
HCT VFR BLD CALC: 37.7 % — LOW (ref 39–50)
HGB BLD-MCNC: 11 G/DL — LOW (ref 13–17)
MAGNESIUM SERPL-MCNC: 1.9 MG/DL — SIGNIFICANT CHANGE UP (ref 1.6–2.6)
MCHC RBC-ENTMCNC: 26 PG — LOW (ref 27–34)
MCHC RBC-ENTMCNC: 29.2 GM/DL — LOW (ref 32–36)
MCV RBC AUTO: 89.1 FL — SIGNIFICANT CHANGE UP (ref 80–100)
NT-PROBNP SERPL-SCNC: 2249 PG/ML — HIGH (ref 0–300)
PLATELET # BLD AUTO: 228 K/UL — SIGNIFICANT CHANGE UP (ref 150–400)
POTASSIUM SERPL-MCNC: 4.2 MMOL/L — SIGNIFICANT CHANGE UP (ref 3.5–5.3)
POTASSIUM SERPL-SCNC: 4.2 MMOL/L — SIGNIFICANT CHANGE UP (ref 3.5–5.3)
RBC # BLD: 4.23 M/UL — SIGNIFICANT CHANGE UP (ref 4.2–5.8)
RBC # FLD: 16.6 % — HIGH (ref 10.3–14.5)
SODIUM SERPL-SCNC: 139 MMOL/L — SIGNIFICANT CHANGE UP (ref 135–145)
WBC # BLD: 8.49 K/UL — SIGNIFICANT CHANGE UP (ref 3.8–10.5)
WBC # FLD AUTO: 8.49 K/UL — SIGNIFICANT CHANGE UP (ref 3.8–10.5)

## 2019-08-17 PROCEDURE — 99223 1ST HOSP IP/OBS HIGH 75: CPT | Mod: GC

## 2019-08-17 PROCEDURE — 93010 ELECTROCARDIOGRAM REPORT: CPT

## 2019-08-17 RX ORDER — METOPROLOL TARTRATE 50 MG
100 TABLET ORAL DAILY
Refills: 0 | Status: DISCONTINUED | OUTPATIENT
Start: 2019-08-18 | End: 2019-08-18

## 2019-08-17 RX ORDER — NYSTATIN CREAM 100000 [USP'U]/G
1 CREAM TOPICAL
Refills: 0 | Status: DISCONTINUED | OUTPATIENT
Start: 2019-08-17 | End: 2019-08-26

## 2019-08-17 RX ORDER — FUROSEMIDE 40 MG
20 TABLET ORAL ONCE
Refills: 0 | Status: COMPLETED | OUTPATIENT
Start: 2019-08-17 | End: 2019-08-17

## 2019-08-17 RX ORDER — ASCORBIC ACID 60 MG
500 TABLET,CHEWABLE ORAL DAILY
Refills: 0 | Status: DISCONTINUED | OUTPATIENT
Start: 2019-08-17 | End: 2019-08-26

## 2019-08-17 RX ORDER — ZINC SULFATE TAB 220 MG (50 MG ZINC EQUIVALENT) 220 (50 ZN) MG
220 TAB ORAL ONCE
Refills: 0 | Status: COMPLETED | OUTPATIENT
Start: 2019-08-17 | End: 2019-08-17

## 2019-08-17 RX ORDER — LOSARTAN POTASSIUM 100 MG/1
25 TABLET, FILM COATED ORAL DAILY
Refills: 0 | Status: DISCONTINUED | OUTPATIENT
Start: 2019-08-18 | End: 2019-08-21

## 2019-08-17 RX ADMIN — Medication 100 MILLIGRAM(S): at 11:53

## 2019-08-17 RX ADMIN — SODIUM CHLORIDE 3 MILLILITER(S): 9 INJECTION INTRAMUSCULAR; INTRAVENOUS; SUBCUTANEOUS at 12:01

## 2019-08-17 RX ADMIN — LISINOPRIL 5 MILLIGRAM(S): 2.5 TABLET ORAL at 06:01

## 2019-08-17 RX ADMIN — Medication 500 MILLIGRAM(S): at 11:56

## 2019-08-17 RX ADMIN — NYSTATIN CREAM 1 APPLICATION(S): 100000 CREAM TOPICAL at 18:15

## 2019-08-17 RX ADMIN — AMLODIPINE BESYLATE 5 MILLIGRAM(S): 2.5 TABLET ORAL at 05:09

## 2019-08-17 RX ADMIN — FAMOTIDINE 40 MILLIGRAM(S): 10 INJECTION INTRAVENOUS at 11:54

## 2019-08-17 RX ADMIN — Medication 1 TABLET(S): at 11:53

## 2019-08-17 RX ADMIN — SENNA PLUS 1 TABLET(S): 8.6 TABLET ORAL at 20:50

## 2019-08-17 RX ADMIN — BUPROPION HYDROCHLORIDE 300 MILLIGRAM(S): 150 TABLET, EXTENDED RELEASE ORAL at 11:53

## 2019-08-17 RX ADMIN — ATORVASTATIN CALCIUM 80 MILLIGRAM(S): 80 TABLET, FILM COATED ORAL at 20:50

## 2019-08-17 RX ADMIN — Medication 2 MILLIGRAM(S): at 20:50

## 2019-08-17 RX ADMIN — OXCARBAZEPINE 150 MILLIGRAM(S): 300 TABLET, FILM COATED ORAL at 06:01

## 2019-08-17 RX ADMIN — OXCARBAZEPINE 150 MILLIGRAM(S): 300 TABLET, FILM COATED ORAL at 18:15

## 2019-08-17 RX ADMIN — Medication 50 MILLIGRAM(S): at 06:01

## 2019-08-17 RX ADMIN — TAMSULOSIN HYDROCHLORIDE 0.4 MILLIGRAM(S): 0.4 CAPSULE ORAL at 20:50

## 2019-08-17 RX ADMIN — SODIUM CHLORIDE 3 MILLILITER(S): 9 INJECTION INTRAMUSCULAR; INTRAVENOUS; SUBCUTANEOUS at 05:10

## 2019-08-17 RX ADMIN — DULOXETINE HYDROCHLORIDE 60 MILLIGRAM(S): 30 CAPSULE, DELAYED RELEASE ORAL at 11:53

## 2019-08-17 RX ADMIN — ARIPIPRAZOLE 5 MILLIGRAM(S): 15 TABLET ORAL at 11:53

## 2019-08-17 RX ADMIN — CLOPIDOGREL BISULFATE 75 MILLIGRAM(S): 75 TABLET, FILM COATED ORAL at 05:07

## 2019-08-17 RX ADMIN — Medication 50 MILLIGRAM(S): at 18:15

## 2019-08-17 RX ADMIN — SODIUM CHLORIDE 3 MILLILITER(S): 9 INJECTION INTRAMUSCULAR; INTRAVENOUS; SUBCUTANEOUS at 20:53

## 2019-08-17 RX ADMIN — Medication 20 MILLIGRAM(S): at 10:06

## 2019-08-17 RX ADMIN — FINASTERIDE 5 MILLIGRAM(S): 5 TABLET, FILM COATED ORAL at 11:54

## 2019-08-17 RX ADMIN — ZINC SULFATE TAB 220 MG (50 MG ZINC EQUIVALENT) 220 MILLIGRAM(S): 220 (50 ZN) TAB at 11:57

## 2019-08-17 RX ADMIN — Medication 81 MILLIGRAM(S): at 11:52

## 2019-08-17 NOTE — CONSULT NOTE ADULT - SUBJECTIVE AND OBJECTIVE BOX
Patient is a 63y old  Male who presents with a chief complaint of For cath evaluation       HPI:  63 year old  male no implantable device with PMHx: HTN, DMT2 (not on meds, last A1C in 2016 was 5.6), CAD, MI followed by CABG X 4 (2007), Stent x 2 in 2011, CVA with right side weakness and tremors, HTN, HLD, stage III- IV sacral decubitus presented [facility] on 8/8/2019 c/o SOB, no chest pain, no dizziness or syncopal episode. CXR showed pulm congestion with prBNP 10k, WBC 18, was placed on BIPAP admitted to CCU for CHF exacerbation diuresed, and now on 3L/NC. Had mild elevation of Troponin 0.38 from 0.36. On admission PE, pt had bilateral crackles with JVD as per record. For possible pnemonia & UTI (positive for 3 organism), a course of ABX (Rocephin and Zithromax) completed on 8/15. EF 10% on this admission compare to last EF 14% by stress test on 8/5/2019 at [facility].      PAST MEDICAL & SURGICAL HISTORY:  Benign prostatic hypertrophy  Sacral decubitus ulcer, stage III: to IV  Congestive heart failure  Obesity  Diabetes  CVA (Cerebral Infarction)  Myocardial Infarction  CAD (Coronary Artery Disease)  Hypercholesteremia  Hypertension  Depression  Tracheostomy: 3 years ago with CABG  CABG (Coronary Artery Bypass Graft): X4 vessels 3 years ago      Social History: Lives with sister and HHA     FAMILY HISTORY:  Atrial fibrillation (Sibling)  Family history of pacemaker (Sibling)      Allergies    No Known Allergies    Intolerances        REVIEW OF SYSTEMS:    CONSTITUTIONAL: No fever, weight loss, or fatigue  EYES: No eye pain, visual disturbances, or discharge  RESPIRATORY: No cough, wheezing, chills or hemoptysis; shortness of breath  CARDIOVASCULAR: No chest pain, palpitations, dizziness, or leg swelling  GASTROINTESTINAL: No abdominal or epigastric pain. No nausea, vomiting, or hematemesis; No diarrhea or constipation. No melena or hematochezia.  GENITOURINARY: No dysuria, frequency, hematuria, or incontinence      MEDICATIONS  (STANDING):  ARIPiprazole 5 milliGRAM(s) Oral daily  ascorbic acid 500 milliGRAM(s) Oral daily  aspirin enteric coated 81 milliGRAM(s) Oral daily  atorvastatin 80 milliGRAM(s) Oral at bedtime  buPROPion XL . 300 milliGRAM(s) Oral daily  clonazePAM  Tablet 2 milliGRAM(s) Oral at bedtime  clopidogrel Tablet 75 milliGRAM(s) Oral daily  docusate sodium 100 milliGRAM(s) Oral daily  DULoxetine 60 milliGRAM(s) Oral daily  famotidine    Tablet 40 milliGRAM(s) Oral daily  finasteride 5 milliGRAM(s) Oral daily  metoprolol tartrate 50 milliGRAM(s) Oral two times a day  multivitamin/minerals 1 Tablet(s) Oral daily  nystatin Cream 1 Application(s) Topical two times a day  OXcarbazepine 150 milliGRAM(s) Oral two times a day  senna 1 Tablet(s) Oral at bedtime  sodium chloride 0.9% lock flush 3 milliLiter(s) IV Push every 8 hours  tamsulosin 0.4 milliGRAM(s) Oral at bedtime    MEDICATIONS  (PRN):      Vital Signs Last 24 Hrs  T(C): 37.1 (17 Aug 2019 04:14), Max: 37.3 (16 Aug 2019 20:33)  T(F): 98.7 (17 Aug 2019 04:14), Max: 99.2 (16 Aug 2019 20:33)  HR: 66 (17 Aug 2019 10:11) (64 - 82)  BP: 98/61 (17 Aug 2019 10:11) (98/61 - 120/76)  BP(mean): 85 (16 Aug 2019 14:26) (85 - 85)  RR: 18 (17 Aug 2019 04:14) (18 - 18)  SpO2: 92% (17 Aug 2019 10:11) (92% - 100%)    PHYSICAL EXAM:    GENERAL: NAD, well-groomed, well-developed  HEAD:  Atraumatic, Normocephalic  EYES: EOMI, PERRLA, conjunctiva and sclera clear  ENMT: No tonsillar erythema, exudates, or enlargement; Moist mucous membranes, Good dentition, No lesions  NECK: Supple, No JVD, Normal thyroid  NERVOUS SYSTEM:  Alert &   CHEST/LUNG: Clear bilaterally; No rales, rhonchi, wheezing, or rubs  HEART: Regular rate and rhythm; No murmurs, rubs, or gallops  ABDOMEN: Soft, Nontender, Nondistended; Bowel sounds present  Decubitus +    LABS:                        11.0   8.49  )-----------( 228      ( 17 Aug 2019 10:29 )             37.7     08-17    139  |  104  |  20  ----------------------------<  83  4.2   |  26  |  1.29    Ca    9.0      17 Aug 2019 07:11  Mg     1.9     08-17              RADIOLOGY & ADDITIONAL STUDIES:

## 2019-08-17 NOTE — DIETITIAN INITIAL EVALUATION ADULT. - OTHER INFO
Pt reports good appetite and PO intake at home; reports following a low salt diet. Confirms NKFA. Pt reports taking Multivitamin and Glucerna or Ensure (unable to recall which) 1xday PTA. Denies monitoring BG and taking any insulin/medications for BG at home; unable to recall last HbA1c. Denies obtaining daily weights at home.      Confirmed information with RN. Pt reports good appetite and PO intake, denies changes compared with PTA. Noted 50% PO intake as per breakfast tray at bedside - pt states normally not consuming breakfast. Offered nutritional supplementation to help with pressure ulcer healing - pt agreed to Glucerna, spoke to provider. Denies difficulty chewing/swallowing. Pt denies nausea, vomiting, diarrhea, or constipation, reports last BM PTA - states is "normal" for him, pt on bowel regimen as per chart.     Stressed the importance of increased kcal and protein intake to help with pressure ulcer healing, provided recommendations to optimize, recommended ordering nutrient-dense snacks and leaving food away from tray for later consumption during the day or between meals, to start with protein, and sips of supplement throughout the day; reviewed foods with protein and menu order procedures in hospital.     Reviewed education on heart failure nutrition therapy. Recommended limited salt intake. Reviewed foods high in salt and amount of salt recommended per day. Discussed nutrition label reading. Stressed the importance of limited fried foods and saturated fat consumption. Discussed the importance of daily weights at home and weight gain parameters for contacting MD. Pt amenable for recommendations/education - made aware RD remains available.

## 2019-08-17 NOTE — PHYSICAL THERAPY INITIAL EVALUATION ADULT - PERTINENT HX OF CURRENT PROBLEM, REHAB EVAL
64 yo M w/ PMHx: HTN, DMT2, CAD, MI, CABG4 (2007), Stent x 2 (2011), CVA w/ R side weakness/tremors and stage III- IV sacral decub p/w SOB at OSH (8/8/2019). CXR= pulm congestion w/ prBNP 10k, WBC 18. Pt a/w CHF (EF 10%), UTI and PNA. Pt t/f to [facility] for card cath and further treatment options.

## 2019-08-17 NOTE — DIETITIAN INITIAL EVALUATION ADULT. - ENERGY NEEDS
Pertinent information as per chart: Pt 64 y/o M with PMH: HTN, DM (not on medications), CAD, MI S/P CABG x 4 (2011) and stent x 2 (2011), CHF, CVA with right-sided weakness and tremors, HLD, stage 3-4 sacral ulcer, BPH, admitted with SOB, CHF exacerbation, for cath evaluation or other treatment options.

## 2019-08-17 NOTE — PHYSICAL THERAPY INITIAL EVALUATION ADULT - MODALITIES TREATMENT COMMENTS
Healing stage IV sacral wound. 5.0cm x3.0cmx2.0cm +Undering 12-1:00 0.7cm +epibole, no granular tissue noted periwound +fungal skin growth

## 2019-08-17 NOTE — CONSULT NOTE ADULT - ATTENDING COMMENTS
This is a pleasant 62 yo M with ICM, LVEF 10-15%, and severe LV enlargement on recent nuclear stress test who was admitted for cardiac cath. The HF service was consulted to provide medical optimization prior to considering an intervention. The patient's sister was present at the bedside and his brother participated in the interview by phone. Overall, he is able to get around the home using a walker with everything on a single level. He does not do anything more strenuous than walking. He denies orthopnea, PND, palpitations, and syncope. Normal bowel habits and he has had an indwelling Pro catheter for a few months according to his sister for "an infection." He has not had LE edema, but he does have sacral decubiti and some dependent edema. He is able to speak in full sentences and has no overt volume overload.    Current PE notable for JVP 6 cm H2O, lungs CTA b/l, RRR without MRG, obese and soft abdomen and no LE edema. He feels warm and well perfused. He was given Norvasc 5 mg and Lisinopril 5 mg this morning, which were then discontinued at my request. He did receive lasix 20 mg IV early morning with clear, yellow urine output now.    Given his chronic HF and presentation with SOB and a positive nuclear stress test, he may have had both HF and angina contributing to his symptoms. He currently denies those symptoms. He is not, though, on the best HF therapies and we should make the following changes:    1. Start losartan 25 mg once daily tomorrow AM as a "bridge" to Entresto. He must be 36-hours free from an ACE-I before starting Entresto.  2. Norvasc and Lisinopril appropriately discontinued. Norvasc has not benefit in HF, but if he needs an agent for coronary relaxation, ISDN would be the preferred choice.  3. Please switch the lopressor 50 mg BID to Toprol  mg daily starting tomorrow morning. The long-acting metoprolol is better for patients with HFrEF.  4. TTE requested here as his last full echo was done in July 2014.  5. No further diuretics needed today. Will reasses tomorrow.

## 2019-08-17 NOTE — DIETITIAN INITIAL EVALUATION ADULT. - CONTINUE CURRENT NUTRITION CARE PLAN
yes/1. Recommend continue DASH/TLC diet. Will continue to monitor and adjust as needed. 2. Recommend Glucerna Shake 240mls 2x daily (440kcals, 20g protein) to optimize kcal and protein intake and help with pressure ulcer healing. Spoke to provider.

## 2019-08-17 NOTE — DIETITIAN INITIAL EVALUATION ADULT. - NS FNS WEIGHT CHANGE REASON
Pt reports 30 pounds weight loss in the "last weeks" PTA due to fluid loss, from 230 to 200 pounds; states clothes fit him the same way. Weight as per flow sheets (08/16) 199 pounds -?accuracy due to fluid accumulation, will continue to monitor.

## 2019-08-17 NOTE — DIETITIAN INITIAL EVALUATION ADULT. - PHYSICAL APPEARANCE
No visual signs of muscle/fat loss noted/overweight/other (specify) Ht: 71 inches Wt: 199 pounds BMI: 27.7 kg/m2 IBW: 172 (+/-10%) 115.6 %IBW  Noted +2 lovely. leg edema as per flow sheets.   Skin: pressure ulcer in sacrum stage IV as per documentation.

## 2019-08-17 NOTE — DIETITIAN INITIAL EVALUATION ADULT. - ADD RECOMMEND
1. Will continue to monitor PO intake, weight, labs, skin, GI status, diet. 2. Encourage PO intake and obtain food preferences (pt did not have any at this time). 3. Recommend continue Multivitamin and add vitamin C and Zinc x 10 days to optimize nutrient intake and help with pressure ulcer healing. Spoke to provider. 4. Provided recommendations to optimize kcal and protein intake for skin healing and education on heart failure nutrition therapy - pt made aware RD remains available.

## 2019-08-17 NOTE — DIETITIAN INITIAL EVALUATION ADULT. - REASON INDICATOR FOR ASSESSMENT
Nutrition consult received for pressure ulcer >2.   Information obtained from: medical record, pt, and RN.   Pt forgetful at times as per documentation, no family at bedside - ?accuracy of information obtained.

## 2019-08-17 NOTE — PHYSICAL THERAPY INITIAL EVALUATION ADULT - ADDITIONAL COMMENTS
Pt lives with his sister in a house (elevators, no stairs)  with HHA 6 hours/day, 5 days/week.  Pt is able to ambulate around the house with a walker.  Pt has a motorized scooter, w/c, electric recliner, and a rolling walker. Pt lives with his sister in a house (elevators, no stairs)  with HHA 8 hours/day, 7 days/week.  Pt is able to ambulate around the house with a walker.  Pt has a motorized scooter, w/c, electric recliner, and a rolling walker.

## 2019-08-17 NOTE — CONSULT NOTE ADULT - PROBLEM SELECTOR PROBLEM 2
Sacral decubitus ulcer, stage III
Coronary artery disease involving native coronary artery of native heart, angina presence unspecified

## 2019-08-17 NOTE — CONSULT NOTE ADULT - SUBJECTIVE AND OBJECTIVE BOX
Patient seen and evaluated at bedside    Chief Complaint:    HPI:  63 year old  male no implantable device with PMHx: HTN, DMT2 (not on meds, last A1C in 2016 was 5.6), CAD, MI followed by CABG X 4 (2007), Stent x 2 in 2011, CVA with right side weakness and tremors, HTN, HLD, stage III- IV sacral decubitus presented to [facility] on 8/8/2019 c/o SOB, no chest pain, no dizziness or syncopal episode. CXR showed pulm congestion with prBNP 10k, WBC 18, was placed on BIPAP admitted to CCU for CHF exacerbation diuresed, and now on 3L/NC. Had mild elevation of Troponin 0.38 from 0.36. On admission PE, pt had bilateral crackles with JVD as per record. For possible pnemonia & UTI (positive for 3 organism), a course of ABX (Rocephin and Zithromax) completed on 8/15. EF 10% on this admission compare to last EF 14% by stress test on 8/5/2019 at [facility].  Pro cath changed on 8/12 by urologist, Urine C/s resulted positive for E coli, Proteus Mirabillis, sensitive to ceftriaxone. Repeat urine C/s pending as per note. Having decrease EF from 15 to 10% with mild elevation of Troponin, pt is now transferred here for cardiac cath and further treatment options.     Last stress test on 8/5/2019, last seen by Dr. Padron was 7/11/2019.    < from: Nuclear Stress Test-Pharmacologic (08.05.19 @ 17:04) >  * Baseline ECG: There were up to 1 mm downsloping ST segment depressions and T wave inversions in leads II, III, aVF, and V4-V6 at baseline.  * ECG Changes: Baseline ECG abnormalities persisted during rest, stress, and recovery. * Arrhythmia: Occasional VPDs occurred during rest, stress and recovery.  * The left ventricle was enlarged. There are large, moderate to severe defects in the anterior, anterolateral, basal and distal anteroseptal, and apical walls that are mostly fixed suggestive of infarct with mild paulo-infarct ischemia.  * There are large, severe defects in the inferior, inferolateral, and basal and distal inferoseptal walls that are mostly fixed suggestive of infarct with mild paulo-infarct ischemia. * Post-stress gated wall motion analysis was performed (LVEF = 14 %;LVEDV = 346 ml.) Severe global LV systolic dysfunction.      He was transferred her for ischemic evaluation and cath, cath was held off for advanced CHF evaluation given his severe dysfunction, concern it would not help patient.  He reports getting significantly diuresed at [facility], his dyspnea has improved significantly.  Otherwise he has not noticed any change in appetite or lower extremity swelling.    Echo on 8/8/2019: mod dilated LV size, normal RV systolic function, mod dilated left atrium EF 10%.     [facility] Urology clinic: [phone],   PCP visiting doctor Sergey Wasvangie , [phone], Psychiatrist Dr. Shara Muse , [phone] (fax)  Labs on  8/15 WBC 5.59, H/H 10/33, plt 207, , K 4.4, chl 103, CO2 28, BUN 23, Cr 1.25 (16 Aug 2019 14:26)      PMHx:   Benign prostatic hypertrophy  Sacral decubitus ulcer, stage III  Congestive heart failure  Obesity  Diabetes  CVA (Cerebral Infarction)  Myocardial Infarction  CAD (Coronary Artery Disease)  Hypercholesteremia  Hypertension  Depression      PSHx:   Tracheostomy  CABG (Coronary Artery Bypass Graft)      Allergies:  No Known Allergies      Home Meds:  · 	metoprolol tartrate 50 mg oral tablet: Last Dose Taken:  , 1 tab(s) orally 2 times a day Home & hospital  · 	lisinopril 5 mg oral tablet: Last Dose Taken:  , 1 tab(s) orally once a day  	 Home & hospital  · 	Crestor 20 mg oral tablet: Last Dose Taken:  , 1 tab(s) orally once a day Home & hospital  · 	Flomax 0.4 mg oral capsule: Last Dose Taken:  , 1 cap(s) orally once a day  · 	Centrum Adults oral tablet: Last Dose Taken:  , 1 tab(s) orally once a day Home & Hospital  · 	Colace 100 mg oral capsule: Last Dose Taken:  , 3 cap(s) orally once a day (at bedtime) Home  · 	Senna Lax 8.6 mg oral tablet: Last Dose Taken:  , 1 tab(s) orally once a day (at bedtime) Home  · 	amLODIPine 5 mg oral tablet: Last Dose Taken:  , 1 tab(s) orally once a day Home  · 	Plavix 75 mg oral tablet: Last Dose Taken:  , 1 tab(s) orally once a day Home & hospital  · 	clonazePAM 2 mg oral tablet: Last Dose Taken:  , 1 tab(s) orally once a day (at bedtime) Home & Hospital  · 	Trileptal 150 mg oral tablet: Last Dose Taken:  , 1 tab(s) orally 2 times a day  	Home & hospital  · 	buPROPion 300 mg/24 hours (XL) oral tablet, extended release: Last Dose Taken:  , 1 tab(s) orally every 24 hours Home  · 	DULoxetine 60 mg oral delayed release capsule: Last Dose Taken:  , 2 cap(s) orally once a day  	Home & hospital  · 	Aspirin Enteric Coated 81 mg oral delayed release tablet: Last Dose Taken:  , 1 tab(s) orally once a day Home [facility] Hospital  · 	Abilify 5 mg oral tablet: Last Dose Taken:  , 1 tab(s) orally once a day  	 Home [facility] Hospital  · 	famotidine 40 mg oral tablet: Last Dose Taken:  , 1 tab(s) orally once a day (at bedtime)DP with doppler Home & hospital  · 	finasteride 5 mg oral tablet: Last Dose Taken:  , 1 tab(s) orally once a day  · 	buPROPion 150 mg/12 hours (SR) oral tablet, extended release: Last Dose Taken:  , 1 tab(s) orally 2 times a day Hospital  · 	collagen topical gel: Apply topically to affected area once a day Hospital to sacral area  · 	clotrimazole 1% topical cream: Apply topically to affected area 2 times a day affected area    Current Medications:   amLODIPine   Tablet 5 milliGRAM(s) Oral daily  ARIPiprazole 5 milliGRAM(s) Oral daily  aspirin enteric coated 81 milliGRAM(s) Oral daily  atorvastatin 80 milliGRAM(s) Oral at bedtime  buPROPion XL . 300 milliGRAM(s) Oral daily  clonazePAM  Tablet 2 milliGRAM(s) Oral at bedtime  clopidogrel Tablet 75 milliGRAM(s) Oral daily  docusate sodium 100 milliGRAM(s) Oral daily  DULoxetine 60 milliGRAM(s) Oral daily  famotidine    Tablet 40 milliGRAM(s) Oral daily  finasteride 5 milliGRAM(s) Oral daily  lisinopril 5 milliGRAM(s) Oral daily  metoprolol tartrate 50 milliGRAM(s) Oral two times a day  multivitamin/minerals 1 Tablet(s) Oral daily  OXcarbazepine 150 milliGRAM(s) Oral two times a day  senna 1 Tablet(s) Oral at bedtime  sodium chloride 0.9% lock flush 3 milliLiter(s) IV Push every 8 hours  tamsulosin 0.4 milliGRAM(s) Oral at bedtime      FAMILY HISTORY:  Atrial fibrillation (Sibling)  Family history of pacemaker (Sibling)      Social History:  · Marital Status	Single	  · Lives With	children; sister	     Substance Use History:  · Substance Use	never used	     Alcohol Use History:  · Have you ever consumed alcohol	never	     Tobacco Usage:  · Tobacco Usage: Never smoker	      REVIEW OF SYSTEMS:  CONSTITUTIONAL: + weakness  EYES/ENT: No visual changes  NECK: No pain or stiffness  RESPIRATORY: No cough, continues to have shortness of breath but much better  CARDIOVASCULAR: No chest pain or palpitations; No lower extremity edema  GASTROINTESTINAL: No abdominal or epigastric pain. No nausea  BACK: No back pain  GENITOURINARY: No dysuria, frequency or hematuria  NEUROLOGICAL: No numbness or weakness  SKIN: No itching, burning, rashes, or lesions   All other review of systems is negative unless indicated above.    Physical Exam:  T(F): 98.5 (08-17), Max: 99.2 (08-16)  HR: 70 (08-17) (70 - 82)  BP: 101/63 (08-17) (100/63 - 120/76)  RR: 18 (08-17)  SpO2: 100% (08-17)  GENERAL: No acute distress, disheveled  HEAD:  Atraumatic, Normocephalic  ENT: EOMI, PERRLA  CHEST/LUNG: Clear to auscultation bilaterally; No wheeze, equal breath sounds bilaterally   BACK: No spinal tenderness  HEART: Regular rate and rhythm; No murmurs, rubs, or gallops  ABDOMEN: Soft, Nontender, mildly distended; Bowel sounds present  EXTREMITIES:  No clubbing, cyanosis, or edema  PSYCH: Nl behavior, nl affect  NEUROLOGY: AAOx3, right arm with contracture, residual weakness   SKIN: Normal color, No rashes or lesions    Assessment and Plan:    Acute on Chronic Systolic Heart Failure likely from ICM  - reports significant diuresis out outside hospital, went from Bipap to 2L NC  - given severe LV dysfunction and hospitalization may need to consider for advanced heart failure therapy  - continue daily diuresis   - continue lisinopril 5, metop 50 bid  - need to get labs BMP, BNP, Mg    CAD  - continue ASA/Plavix/Statin      Kahlil Pratt MD  Cardiology Fellow   [phone]  For all other Cardiology service contact information, go to [email or web address] and use "[email or web address]" to login.                For all Cardiology service contact information, go to [email or web address] and use "[email or web address]" to login. Patient seen and evaluated at bedside    Chief Complaint:    HPI:  63 year old  male no implantable device with PMHx: HTN, DMT2 (not on meds, last A1C in 2016 was 5.6), CAD, MI followed by CABG X 4 (2007), Stent x 2 in 2011, CVA with right side weakness and tremors, HTN, HLD, stage III- IV sacral decubitus presented to [facility] on 8/8/2019 c/o SOB, no chest pain, no dizziness or syncopal episode. CXR showed pulm congestion with prBNP 10k, WBC 18, was placed on BIPAP admitted to CCU for CHF exacerbation diuresed, and now on 3L/NC. Had mild elevation of Troponin 0.38 from 0.36. On admission PE, pt had bilateral crackles with JVD as per record. For possible pnemonia & UTI (positive for 3 organism), a course of ABX (Rocephin and Zithromax) completed on 8/15. EF 10% on this admission compare to last EF 14% by stress test on 8/5/2019 at [facility].  Pro cath changed on 8/12 by urologist, Urine C/s resulted positive for E coli, Proteus Mirabillis, sensitive to ceftriaxone. Repeat urine C/s pending as per note. Having decrease EF from 15 to 10% with mild elevation of Troponin, pt is now transferred here for cardiac cath and further treatment options.     Last stress test on 8/5/2019, last seen by Dr. Padron was 7/11/2019.    < from: Nuclear Stress Test-Pharmacologic (08.05.19 @ 17:04) >  * Baseline ECG: There were up to 1 mm downsloping ST segment depressions and T wave inversions in leads II, III, aVF, and V4-V6 at baseline.  * ECG Changes: Baseline ECG abnormalities persisted during rest, stress, and recovery. * Arrhythmia: Occasional VPDs occurred during rest, stress and recovery.  * The left ventricle was enlarged. There are large, moderate to severe defects in the anterior, anterolateral, basal and distal anteroseptal, and apical walls that are mostly fixed suggestive of infarct with mild paulo-infarct ischemia.  * There are large, severe defects in the inferior, inferolateral, and basal and distal inferoseptal walls that are mostly fixed suggestive of infarct with mild paulo-infarct ischemia. * Post-stress gated wall motion analysis was performed (LVEF = 14 %;LVEDV = 346 ml.) Severe global LV systolic dysfunction.      He was transferred her for ischemic evaluation and cath, cath was held off for advanced CHF evaluation given his severe dysfunction, concern it would not help patient.  He reports getting significantly diuresed at [facility], his dyspnea has improved significantly.  Otherwise he has not noticed any change in appetite or lower extremity swelling.    Echo on 8/8/2019: mod dilated LV size, normal RV systolic function, mod dilated left atrium EF 10%.     [facility] Urology clinic: [phone],   PCP visiting doctor Sergey Wasvangie , [phone], Psychiatrist Dr. Shara Muse , [phone] (fax)  Labs on  8/15 WBC 5.59, H/H 10/33, plt 207, , K 4.4, chl 103, CO2 28, BUN 23, Cr 1.25 (16 Aug 2019 14:26)      PMHx:   Benign prostatic hypertrophy  Sacral decubitus ulcer, stage III  Congestive heart failure  Obesity  Diabetes  CVA (Cerebral Infarction)  Myocardial Infarction  CAD (Coronary Artery Disease)  Hypercholesteremia  Hypertension  Depression      PSHx:   Tracheostomy  CABG (Coronary Artery Bypass Graft)      Allergies:  No Known Allergies      Home Meds:  · 	metoprolol tartrate 50 mg oral tablet: Last Dose Taken:  , 1 tab(s) orally 2 times a day Home & hospital  · 	lisinopril 5 mg oral tablet: Last Dose Taken:  , 1 tab(s) orally once a day  	 Home & hospital  · 	Crestor 20 mg oral tablet: Last Dose Taken:  , 1 tab(s) orally once a day Home & hospital  · 	Flomax 0.4 mg oral capsule: Last Dose Taken:  , 1 cap(s) orally once a day  · 	Centrum Adults oral tablet: Last Dose Taken:  , 1 tab(s) orally once a day Home & Hospital  · 	Colace 100 mg oral capsule: Last Dose Taken:  , 3 cap(s) orally once a day (at bedtime) Home  · 	Senna Lax 8.6 mg oral tablet: Last Dose Taken:  , 1 tab(s) orally once a day (at bedtime) Home  · 	amLODIPine 5 mg oral tablet: Last Dose Taken:  , 1 tab(s) orally once a day Home  · 	Plavix 75 mg oral tablet: Last Dose Taken:  , 1 tab(s) orally once a day Home & hospital  · 	clonazePAM 2 mg oral tablet: Last Dose Taken:  , 1 tab(s) orally once a day (at bedtime) Home [facility] Hospital  · 	Trileptal 150 mg oral tablet: Last Dose Taken:  , 1 tab(s) orally 2 times a day  	Home & hospital  · 	buPROPion 300 mg/24 hours (XL) oral tablet, extended release: Last Dose Taken:  , 1 tab(s) orally every 24 hours Home  · 	DULoxetine 60 mg oral delayed release capsule: Last Dose Taken:  , 2 cap(s) orally once a day  	Home & hospital  · 	Aspirin Enteric Coated 81 mg oral delayed release tablet: Last Dose Taken:  , 1 tab(s) orally once a day Home [facility] Hospital  · 	Abilify 5 mg oral tablet: Last Dose Taken:  , 1 tab(s) orally once a day  	 Home & Hospital  · 	famotidine 40 mg oral tablet: Last Dose Taken:  , 1 tab(s) orally once a day (at bedtime)DP with doppler Home & hospital  · 	finasteride 5 mg oral tablet: Last Dose Taken:  , 1 tab(s) orally once a day  · 	buPROPion 150 mg/12 hours (SR) oral tablet, extended release: Last Dose Taken:  , 1 tab(s) orally 2 times a day Hospital  · 	collagen topical gel: Apply topically to affected area once a day Hospital to sacral area  · 	clotrimazole 1% topical cream: Apply topically to affected area 2 times a day affected area    Current Medications:   amLODIPine   Tablet 5 milliGRAM(s) Oral daily  ARIPiprazole 5 milliGRAM(s) Oral daily  aspirin enteric coated 81 milliGRAM(s) Oral daily  atorvastatin 80 milliGRAM(s) Oral at bedtime  buPROPion XL . 300 milliGRAM(s) Oral daily  clonazePAM  Tablet 2 milliGRAM(s) Oral at bedtime  clopidogrel Tablet 75 milliGRAM(s) Oral daily  docusate sodium 100 milliGRAM(s) Oral daily  DULoxetine 60 milliGRAM(s) Oral daily  famotidine    Tablet 40 milliGRAM(s) Oral daily  finasteride 5 milliGRAM(s) Oral daily  lisinopril 5 milliGRAM(s) Oral daily  metoprolol tartrate 50 milliGRAM(s) Oral two times a day  multivitamin/minerals 1 Tablet(s) Oral daily  OXcarbazepine 150 milliGRAM(s) Oral two times a day  senna 1 Tablet(s) Oral at bedtime  sodium chloride 0.9% lock flush 3 milliLiter(s) IV Push every 8 hours  tamsulosin 0.4 milliGRAM(s) Oral at bedtime      FAMILY HISTORY:  Atrial fibrillation (Sibling)  Family history of pacemaker (Sibling)      Social History:  · Marital Status	Single	  · Lives With	children; sister	     Substance Use History:  · Substance Use	never used	     Alcohol Use History:  · Have you ever consumed alcohol	never	     Tobacco Usage:  · Tobacco Usage: Never smoker	      REVIEW OF SYSTEMS:  CONSTITUTIONAL: + weakness  EYES/ENT: No visual changes  NECK: No pain or stiffness  RESPIRATORY: No cough, continues to have shortness of breath but much better  CARDIOVASCULAR: No chest pain or palpitations; No lower extremity edema  GASTROINTESTINAL: No abdominal or epigastric pain. No nausea  BACK: No back pain  GENITOURINARY: No dysuria, frequency or hematuria  NEUROLOGICAL: No numbness or weakness  SKIN: No itching, burning, rashes, or lesions   All other review of systems is negative unless indicated above.    Physical Exam:  T(F): 98.5 (08-17), Max: 99.2 (08-16)  HR: 70 (08-17) (70 - 82)  BP: 101/63 (08-17) (100/63 - 120/76)  RR: 18 (08-17)  SpO2: 100% (08-17)  GENERAL: No acute distress, disheveled  HEAD:  Atraumatic, Normocephalic  ENT: EOMI, PERRLA  CHEST/LUNG: Clear to auscultation bilaterally; No wheeze, equal breath sounds bilaterally   BACK: No spinal tenderness  HEART: Regular rate and rhythm; No murmurs, rubs, or gallops  ABDOMEN: Soft, Nontender, mildly distended; Bowel sounds present  EXTREMITIES:  No clubbing, cyanosis, or edema  PSYCH: Nl behavior, nl affect  NEUROLOGY: AAOx3, right arm with contracture, residual weakness   SKIN: Normal color, No rashes or lesions    Assessment and Plan:    Acute on Chronic Systolic Heart Failure likely from ICM  - reports significant diuresis out outside hospital, went from Bipap to 2L NC  - given severe LV dysfunction and hospitalization may need to consider for advanced heart failure therapy  - please give 20 mg of IV lasix  - continue metop 50 bid  - starting tomorrow recommend switching to losartan 25 mg qday   - need to get labs BMP, BNP, Mg    CAD  - continue ASA/Plavix/Statin    Kahlil Pratt MD  Cardiology Fellow   [phone]  For all other Cardiology service contact information, go to [email or web address] and use "[email or web address]" to login. Patient seen and evaluated at bedside    Chief Complaint:    HPI:  63 year old  male no implantable device with PMHx: HTN, DMT2 (not on meds, last A1C in 2016 was 5.6), CAD, MI followed by CABG X 4 (2007), Stent x 2 in 2011, CVA with right side weakness and tremors, HTN, HLD, stage III- IV sacral decubitus presented to [facility] on 8/8/2019 c/o SOB, no chest pain, no dizziness or syncopal episode. CXR showed pulm congestion with prBNP 10k, WBC 18, was placed on BIPAP admitted to CCU for CHF exacerbation diuresed, and now on 3L/NC. Had mild elevation of Troponin 0.38 from 0.36. On admission PE, pt had bilateral crackles with JVD as per record. For possible pnemonia & UTI (positive for 3 organism), a course of ABX (Rocephin and Zithromax) completed on 8/15. EF 10% on this admission compare to last EF 14% by stress test on 8/5/2019 at [facility].  Pro cath changed on 8/12 by urologist, Urine C/s resulted positive for E coli, Proteus Mirabillis, sensitive to ceftriaxone. Repeat urine C/s pending as per note. Having decrease EF from 15 to 10% with mild elevation of Troponin, pt is now transferred here for cardiac cath and further treatment options.     Last stress test on 8/5/2019, last seen by Dr. Padron was 7/11/2019.    < from: Nuclear Stress Test-Pharmacologic (08.05.19 @ 17:04) >  * Baseline ECG: There were up to 1 mm downsloping ST segment depressions and T wave inversions in leads II, III, aVF, and V4-V6 at baseline.  * ECG Changes: Baseline ECG abnormalities persisted during rest, stress, and recovery. * Arrhythmia: Occasional VPDs occurred during rest, stress and recovery.  * The left ventricle was enlarged. There are large, moderate to severe defects in the anterior, anterolateral, basal and distal anteroseptal, and apical walls that are mostly fixed suggestive of infarct with mild paulo-infarct ischemia.  * There are large, severe defects in the inferior, inferolateral, and basal and distal inferoseptal walls that are mostly fixed suggestive of infarct with mild paulo-infarct ischemia. * Post-stress gated wall motion analysis was performed (LVEF = 14 %;LVEDV = 346 ml.) Severe global LV systolic dysfunction.      He was transferred her for ischemic evaluation and cath, cath was held off for advanced CHF evaluation given his severe dysfunction, concern it would not help patient.  He reports getting significantly diuresed at [facility], his dyspnea has improved significantly.  Otherwise he has not noticed any change in appetite or lower extremity swelling.    Echo on 8/8/2019: mod dilated LV size, normal RV systolic function, mod dilated left atrium EF 10%.     [facility] Urology clinic: [phone],   PCP visiting doctor Sergey Wasvangie , [phone], Psychiatrist Dr. Shara Muse , [phone] (fax)  Labs on  8/15 WBC 5.59, H/H 10/33, plt 207, , K 4.4, chl 103, CO2 28, BUN 23, Cr 1.25 (16 Aug 2019 14:26)      PMHx:   Benign prostatic hypertrophy  Sacral decubitus ulcer, stage III  Congestive heart failure  Obesity  Diabetes  CVA (Cerebral Infarction)  Myocardial Infarction  CAD (Coronary Artery Disease)  Hypercholesteremia  Hypertension  Depression      PSHx:   Tracheostomy  CABG (Coronary Artery Bypass Graft)      Allergies:  No Known Allergies      Home Meds:  · 	metoprolol tartrate 50 mg oral tablet: Last Dose Taken:  , 1 tab(s) orally 2 times a day Home & hospital  · 	lisinopril 5 mg oral tablet: Last Dose Taken:  , 1 tab(s) orally once a day  	 Home & hospital  · 	Crestor 20 mg oral tablet: Last Dose Taken:  , 1 tab(s) orally once a day Home & hospital  · 	Flomax 0.4 mg oral capsule: Last Dose Taken:  , 1 cap(s) orally once a day  · 	Centrum Adults oral tablet: Last Dose Taken:  , 1 tab(s) orally once a day Home & Hospital  · 	Colace 100 mg oral capsule: Last Dose Taken:  , 3 cap(s) orally once a day (at bedtime) Home  · 	Senna Lax 8.6 mg oral tablet: Last Dose Taken:  , 1 tab(s) orally once a day (at bedtime) Home  · 	amLODIPine 5 mg oral tablet: Last Dose Taken:  , 1 tab(s) orally once a day Home  · 	Plavix 75 mg oral tablet: Last Dose Taken:  , 1 tab(s) orally once a day Home & hospital  · 	clonazePAM 2 mg oral tablet: Last Dose Taken:  , 1 tab(s) orally once a day (at bedtime) Home & Hospital  · 	Trileptal 150 mg oral tablet: Last Dose Taken:  , 1 tab(s) orally 2 times a day  	Home & hospital  · 	buPROPion 300 mg/24 hours (XL) oral tablet, extended release: Last Dose Taken:  , 1 tab(s) orally every 24 hours Home  · 	DULoxetine 60 mg oral delayed release capsule: Last Dose Taken:  , 2 cap(s) orally once a day  	Home & hospital  · 	Aspirin Enteric Coated 81 mg oral delayed release tablet: Last Dose Taken:  , 1 tab(s) orally once a day Home [facility] Hospital  · 	Abilify 5 mg oral tablet: Last Dose Taken:  , 1 tab(s) orally once a day  	 Home & Hospital  · 	famotidine 40 mg oral tablet: Last Dose Taken:  , 1 tab(s) orally once a day (at bedtime)DP with doppler Home & hospital  · 	finasteride 5 mg oral tablet: Last Dose Taken:  , 1 tab(s) orally once a day  · 	buPROPion 150 mg/12 hours (SR) oral tablet, extended release: Last Dose Taken:  , 1 tab(s) orally 2 times a day Hospital  · 	collagen topical gel: Apply topically to affected area once a day Hospital to sacral area  · 	clotrimazole 1% topical cream: Apply topically to affected area 2 times a day affected area    Current Medications:   amLODIPine   Tablet 5 milliGRAM(s) Oral daily  ARIPiprazole 5 milliGRAM(s) Oral daily  aspirin enteric coated 81 milliGRAM(s) Oral daily  atorvastatin 80 milliGRAM(s) Oral at bedtime  buPROPion XL . 300 milliGRAM(s) Oral daily  clonazePAM  Tablet 2 milliGRAM(s) Oral at bedtime  clopidogrel Tablet 75 milliGRAM(s) Oral daily  docusate sodium 100 milliGRAM(s) Oral daily  DULoxetine 60 milliGRAM(s) Oral daily  famotidine    Tablet 40 milliGRAM(s) Oral daily  finasteride 5 milliGRAM(s) Oral daily  lisinopril 5 milliGRAM(s) Oral daily  metoprolol tartrate 50 milliGRAM(s) Oral two times a day  multivitamin/minerals 1 Tablet(s) Oral daily  OXcarbazepine 150 milliGRAM(s) Oral two times a day  senna 1 Tablet(s) Oral at bedtime  sodium chloride 0.9% lock flush 3 milliLiter(s) IV Push every 8 hours  tamsulosin 0.4 milliGRAM(s) Oral at bedtime      FAMILY HISTORY:  Atrial fibrillation (Sibling)  Family history of pacemaker (Sibling)      Social History:  · Marital Status	Single	  · Lives With	children; sister	     Substance Use History:  · Substance Use	never used	     Alcohol Use History:  · Have you ever consumed alcohol	never	     Tobacco Usage:  · Tobacco Usage: Never smoker	      REVIEW OF SYSTEMS:  CONSTITUTIONAL: + weakness  EYES/ENT: No visual changes  NECK: No pain or stiffness  RESPIRATORY: No cough, continues to have shortness of breath but much better  CARDIOVASCULAR: No chest pain or palpitations; No lower extremity edema  GASTROINTESTINAL: No abdominal or epigastric pain. No nausea  BACK: No back pain  GENITOURINARY: No dysuria, frequency or hematuria  NEUROLOGICAL: No numbness or weakness  SKIN: No itching, burning, rashes, or lesions   All other review of systems is negative unless indicated above.    Physical Exam:  T(F): 98.5 (08-17), Max: 99.2 (08-16)  HR: 70 (08-17) (70 - 82)  BP: 101/63 (08-17) (100/63 - 120/76)  RR: 18 (08-17)  SpO2: 100% (08-17)  GENERAL: No acute distress, disheveled  HEAD:  Atraumatic, Normocephalic  ENT: EOMI, PERRLA  CHEST/LUNG: Clear to auscultation bilaterally; No wheeze, equal breath sounds bilaterally   BACK: No spinal tenderness  HEART: Regular rate and rhythm; No murmurs, rubs, or gallops  ABDOMEN: Soft, Nontender, mildly distended; Bowel sounds present  EXTREMITIES:  No clubbing, cyanosis, or edema  PSYCH: Nl behavior, nl affect  NEUROLOGY: AAOx3, right arm with contracture, residual weakness   SKIN: Normal color, No rashes or lesions    Assessment and Plan:    Acute on Chronic Systolic Heart Failure likely from ICM  - reports significant diuresis out outside hospital, went from Bipap to 2L NC  - given severe LV dysfunction and hospitalization may need to consider for advanced heart failure therapy  - please give 20 mg of IV lasix  - continue metop 50 bid  - starting tomorrow recommend switching to losartan 25 mg qday   - need to get labs BMP, BNP, Mg  - stop amlodipine    CAD  - continue ASA/Plavix/Statin    Kahlil Pratt MD  Cardiology Fellow   [phone]  For all other Cardiology service contact information, go to [email or web address] and use [email or web address]" to login. Patient seen and evaluated at bedside    Chief Complaint:    HPI:  63 year old  male no implantable device with PMHx: HTN, DMT2 (not on meds, last A1C in 2016 was 5.6), CAD, MI followed by CABG X 4 (2007), Stent x 2 in 2011, CVA with right side weakness and tremors, HTN, HLD, stage III- IV sacral decubitus presented to [facility] on 8/8/2019 c/o SOB, no chest pain, no dizziness or syncopal episode. CXR showed pulm congestion with prBNP 10k, WBC 18, was placed on BIPAP admitted to CCU for CHF exacerbation diuresed, and now on 3L/NC. Had mild elevation of Troponin 0.38 from 0.36. On admission PE, pt had bilateral crackles with JVD as per record. For possible pnemonia & UTI (positive for 3 organism), a course of ABX (Rocephin and Zithromax) completed on 8/15. EF 10% on this admission compare to last EF 14% by stress test on 8/5/2019 at [facility].  Pro cath changed on 8/12 by urologist, Urine C/s resulted positive for E coli, Proteus Mirabillis, sensitive to ceftriaxone. Repeat urine C/s pending as per note. Having decrease EF from 15 to 10% with mild elevation of Troponin, pt is now transferred here for cardiac cath and further treatment options.     Last stress test on 8/5/2019, last seen by Dr. Padron was 7/11/2019.    < from: Nuclear Stress Test-Pharmacologic (08.05.19 @ 17:04) >  * Baseline ECG: There were up to 1 mm downsloping ST segment depressions and T wave inversions in leads II, III, aVF, and V4-V6 at baseline.  * ECG Changes: Baseline ECG abnormalities persisted during rest, stress, and recovery. * Arrhythmia: Occasional VPDs occurred during rest, stress and recovery.  * The left ventricle was enlarged. There are large, moderate to severe defects in the anterior, anterolateral, basal and distal anteroseptal, and apical walls that are mostly fixed suggestive of infarct with mild paulo-infarct ischemia.  * There are large, severe defects in the inferior, inferolateral, and basal and distal inferoseptal walls that are mostly fixed suggestive of infarct with mild paulo-infarct ischemia. * Post-stress gated wall motion analysis was performed (LVEF = 14 %;LVEDV = 346 ml.) Severe global LV systolic dysfunction.      He was transferred her for ischemic evaluation and cath, cath was held off for advanced CHF evaluation given his severe dysfunction, concern it would not help patient.  He reports getting significantly diuresed at [facility], his dyspnea has improved significantly.  Otherwise he has not noticed any change in appetite or lower extremity swelling.    Echo on 8/8/2019: mod dilated LV size, normal RV systolic function, mod dilated left atrium EF 10%.     [facility] clinic: [phone],   PCP visiting doctor Sergey Wasvangie , [phone], Psychiatrist Dr. Shara Muse , [phone] (fax)  Labs on  8/15 WBC 5.59, H/H 10/33, plt 207, , K 4.4, chl 103, CO2 28, BUN 23, Cr 1.25 (16 Aug 2019 14:26)      PMHx:   Benign prostatic hypertrophy  Sacral decubitus ulcer, stage III  Congestive heart failure  Obesity  Diabetes  CVA (Cerebral Infarction)  Myocardial Infarction  CAD (Coronary Artery Disease)  Hypercholesteremia  Hypertension  Depression      PSHx:   Tracheostomy  CABG (Coronary Artery Bypass Graft)      Allergies:  No Known Allergies      Home Meds:  · 	metoprolol tartrate 50 mg oral tablet: Last Dose Taken:  , 1 tab(s) orally 2 times a day Home & hospital  · 	lisinopril 5 mg oral tablet: Last Dose Taken:  , 1 tab(s) orally once a day  	 Home & hospital  · 	Crestor 20 mg oral tablet: Last Dose Taken:  , 1 tab(s) orally once a day Home & hospital  · 	Flomax 0.4 mg oral capsule: Last Dose Taken:  , 1 cap(s) orally once a day  · 	Centrum Adults oral tablet: Last Dose Taken:  , 1 tab(s) orally once a day Home & Hospital  · 	Colace 100 mg oral capsule: Last Dose Taken:  , 3 cap(s) orally once a day (at bedtime) Home  · 	Senna Lax 8.6 mg oral tablet: Last Dose Taken:  , 1 tab(s) orally once a day (at bedtime) Home  · 	amLODIPine 5 mg oral tablet: Last Dose Taken:  , 1 tab(s) orally once a day Home  · 	Plavix 75 mg oral tablet: Last Dose Taken:  , 1 tab(s) orally once a day Home & hospital  · 	clonazePAM 2 mg oral tablet: Last Dose Taken:  , 1 tab(s) orally once a day (at bedtime) Home & Hospital  · 	Trileptal 150 mg oral tablet: Last Dose Taken:  , 1 tab(s) orally 2 times a day  	Home & hospital  · 	buPROPion 300 mg/24 hours (XL) oral tablet, extended release: Last Dose Taken:  , 1 tab(s) orally every 24 hours Home  · 	DULoxetine 60 mg oral delayed release capsule: Last Dose Taken:  , 2 cap(s) orally once a day  	Home & hospital  · 	Aspirin Enteric Coated 81 mg oral delayed release tablet: Last Dose Taken:  , 1 tab(s) orally once a day Home & Hospital  · 	Abilify 5 mg oral tablet: Last Dose Taken:  , 1 tab(s) orally once a day  	 Home & Hospital  · 	famotidine 40 mg oral tablet: Last Dose Taken:  , 1 tab(s) orally once a day (at bedtime)DP with doppler Home & hospital  · 	finasteride 5 mg oral tablet: Last Dose Taken:  , 1 tab(s) orally once a day  · 	buPROPion 150 mg/12 hours (SR) oral tablet, extended release: Last Dose Taken:  , 1 tab(s) orally 2 times a day Hospital  · 	collagen topical gel: Apply topically to affected area once a day Hospital to sacral area  · 	clotrimazole 1% topical cream: Apply topically to affected area 2 times a day affected area    Current Medications:   amLODIPine   Tablet 5 milliGRAM(s) Oral daily  ARIPiprazole 5 milliGRAM(s) Oral daily  aspirin enteric coated 81 milliGRAM(s) Oral daily  atorvastatin 80 milliGRAM(s) Oral at bedtime  buPROPion XL . 300 milliGRAM(s) Oral daily  clonazePAM  Tablet 2 milliGRAM(s) Oral at bedtime  clopidogrel Tablet 75 milliGRAM(s) Oral daily  docusate sodium 100 milliGRAM(s) Oral daily  DULoxetine 60 milliGRAM(s) Oral daily  famotidine    Tablet 40 milliGRAM(s) Oral daily  finasteride 5 milliGRAM(s) Oral daily  lisinopril 5 milliGRAM(s) Oral daily  metoprolol tartrate 50 milliGRAM(s) Oral two times a day  multivitamin/minerals 1 Tablet(s) Oral daily  OXcarbazepine 150 milliGRAM(s) Oral two times a day  senna 1 Tablet(s) Oral at bedtime  sodium chloride 0.9% lock flush 3 milliLiter(s) IV Push every 8 hours  tamsulosin 0.4 milliGRAM(s) Oral at bedtime      FAMILY HISTORY:  Atrial fibrillation (Sibling)  Family history of pacemaker (Sibling)      Social History:  · Marital Status	Single	  · Lives With	children; sister	     Substance Use History:  · Substance Use	never used	     Alcohol Use History:  · Have you ever consumed alcohol	never	     Tobacco Usage:  · Tobacco Usage: Never smoker	      REVIEW OF SYSTEMS:  CONSTITUTIONAL: + weakness  EYES/ENT: No visual changes  NECK: No pain or stiffness  RESPIRATORY: No cough, continues to have shortness of breath but much better  CARDIOVASCULAR: No chest pain or palpitations; No lower extremity edema  GASTROINTESTINAL: No abdominal or epigastric pain. No nausea  BACK: No back pain  GENITOURINARY: No dysuria, frequency or hematuria  NEUROLOGICAL: No numbness or weakness  SKIN: No itching, burning, rashes, or lesions   All other review of systems is negative unless indicated above.    Physical Exam:  T(F): 98.5 (08-17), Max: 99.2 (08-16)  HR: 70 (08-17) (70 - 82)  BP: 101/63 (08-17) (100/63 - 120/76)  RR: 18 (08-17)  SpO2: 100% (08-17)    GENERAL: No acute distress, disheveled  HEAD:  Atraumatic, Normocephalic  ENT: EOMI, PERRLA  CHEST/LUNG: Clear to auscultation bilaterally; No wheeze, equal breath sounds bilaterally   BACK: No spinal tenderness  HEART: Regular rate and rhythm; No murmurs, rubs, or gallops  ABDOMEN: Soft, Nontender, mildly distended; Bowel sounds present  EXTREMITIES:  No clubbing, cyanosis, or edema  PSYCH: Nl behavior, nl affect  NEUROLOGY: AAOx3, right arm with contracture, residual weakness   SKIN: Normal color, No rashes or lesions

## 2019-08-18 LAB
ANION GAP SERPL CALC-SCNC: 10 MMOL/L — SIGNIFICANT CHANGE UP (ref 5–17)
BUN SERPL-MCNC: 23 MG/DL — SIGNIFICANT CHANGE UP (ref 7–23)
CALCIUM SERPL-MCNC: 8.9 MG/DL — SIGNIFICANT CHANGE UP (ref 8.4–10.5)
CHLORIDE SERPL-SCNC: 104 MMOL/L — SIGNIFICANT CHANGE UP (ref 96–108)
CO2 SERPL-SCNC: 25 MMOL/L — SIGNIFICANT CHANGE UP (ref 22–31)
CREAT SERPL-MCNC: 1.4 MG/DL — HIGH (ref 0.5–1.3)
GLUCOSE SERPL-MCNC: 111 MG/DL — HIGH (ref 70–99)
HBA1C BLD-MCNC: 5.3 % — SIGNIFICANT CHANGE UP (ref 4–5.6)
HCT VFR BLD CALC: 38.7 % — LOW (ref 39–50)
HGB BLD-MCNC: 12.7 G/DL — LOW (ref 13–17)
MCHC RBC-ENTMCNC: 28 PG — SIGNIFICANT CHANGE UP (ref 27–34)
MCHC RBC-ENTMCNC: 32.8 GM/DL — SIGNIFICANT CHANGE UP (ref 32–36)
MCV RBC AUTO: 85.2 FL — SIGNIFICANT CHANGE UP (ref 80–100)
PLATELET # BLD AUTO: 227 K/UL — SIGNIFICANT CHANGE UP (ref 150–400)
POTASSIUM SERPL-MCNC: 3.8 MMOL/L — SIGNIFICANT CHANGE UP (ref 3.5–5.3)
POTASSIUM SERPL-SCNC: 3.8 MMOL/L — SIGNIFICANT CHANGE UP (ref 3.5–5.3)
RBC # BLD: 4.54 M/UL — SIGNIFICANT CHANGE UP (ref 4.2–5.8)
RBC # FLD: 16.6 % — HIGH (ref 10.3–14.5)
SODIUM SERPL-SCNC: 139 MMOL/L — SIGNIFICANT CHANGE UP (ref 135–145)
WBC # BLD: 7.2 K/UL — SIGNIFICANT CHANGE UP (ref 3.8–10.5)
WBC # FLD AUTO: 7.2 K/UL — SIGNIFICANT CHANGE UP (ref 3.8–10.5)

## 2019-08-18 PROCEDURE — 99233 SBSQ HOSP IP/OBS HIGH 50: CPT

## 2019-08-18 RX ORDER — POTASSIUM CHLORIDE 20 MEQ
40 PACKET (EA) ORAL ONCE
Refills: 0 | Status: COMPLETED | OUTPATIENT
Start: 2019-08-18 | End: 2019-08-18

## 2019-08-18 RX ORDER — ACETAMINOPHEN 500 MG
650 TABLET ORAL EVERY 6 HOURS
Refills: 0 | Status: DISCONTINUED | OUTPATIENT
Start: 2019-08-18 | End: 2019-08-26

## 2019-08-18 RX ORDER — METOPROLOL TARTRATE 50 MG
100 TABLET ORAL DAILY
Refills: 0 | Status: DISCONTINUED | OUTPATIENT
Start: 2019-08-18 | End: 2019-08-26

## 2019-08-18 RX ORDER — HEPARIN SODIUM 5000 [USP'U]/ML
5000 INJECTION INTRAVENOUS; SUBCUTANEOUS EVERY 12 HOURS
Refills: 0 | Status: DISCONTINUED | OUTPATIENT
Start: 2019-08-18 | End: 2019-08-20

## 2019-08-18 RX ADMIN — Medication 2 MILLIGRAM(S): at 21:02

## 2019-08-18 RX ADMIN — BUPROPION HYDROCHLORIDE 300 MILLIGRAM(S): 150 TABLET, EXTENDED RELEASE ORAL at 12:08

## 2019-08-18 RX ADMIN — Medication 81 MILLIGRAM(S): at 12:06

## 2019-08-18 RX ADMIN — Medication 500 MILLIGRAM(S): at 12:07

## 2019-08-18 RX ADMIN — CLOPIDOGREL BISULFATE 75 MILLIGRAM(S): 75 TABLET, FILM COATED ORAL at 12:07

## 2019-08-18 RX ADMIN — OXCARBAZEPINE 150 MILLIGRAM(S): 300 TABLET, FILM COATED ORAL at 17:23

## 2019-08-18 RX ADMIN — SODIUM CHLORIDE 3 MILLILITER(S): 9 INJECTION INTRAMUSCULAR; INTRAVENOUS; SUBCUTANEOUS at 13:41

## 2019-08-18 RX ADMIN — ARIPIPRAZOLE 5 MILLIGRAM(S): 15 TABLET ORAL at 12:07

## 2019-08-18 RX ADMIN — SODIUM CHLORIDE 3 MILLILITER(S): 9 INJECTION INTRAMUSCULAR; INTRAVENOUS; SUBCUTANEOUS at 21:08

## 2019-08-18 RX ADMIN — Medication 1 TABLET(S): at 12:07

## 2019-08-18 RX ADMIN — Medication 100 MILLIGRAM(S): at 12:07

## 2019-08-18 RX ADMIN — LOSARTAN POTASSIUM 25 MILLIGRAM(S): 100 TABLET, FILM COATED ORAL at 06:15

## 2019-08-18 RX ADMIN — TAMSULOSIN HYDROCHLORIDE 0.4 MILLIGRAM(S): 0.4 CAPSULE ORAL at 21:02

## 2019-08-18 RX ADMIN — Medication 40 MILLIEQUIVALENT(S): at 12:08

## 2019-08-18 RX ADMIN — NYSTATIN CREAM 1 APPLICATION(S): 100000 CREAM TOPICAL at 17:21

## 2019-08-18 RX ADMIN — FAMOTIDINE 40 MILLIGRAM(S): 10 INJECTION INTRAVENOUS at 12:07

## 2019-08-18 RX ADMIN — ATORVASTATIN CALCIUM 80 MILLIGRAM(S): 80 TABLET, FILM COATED ORAL at 21:02

## 2019-08-18 RX ADMIN — FINASTERIDE 5 MILLIGRAM(S): 5 TABLET, FILM COATED ORAL at 12:07

## 2019-08-18 RX ADMIN — DULOXETINE HYDROCHLORIDE 60 MILLIGRAM(S): 30 CAPSULE, DELAYED RELEASE ORAL at 12:07

## 2019-08-18 RX ADMIN — OXCARBAZEPINE 150 MILLIGRAM(S): 300 TABLET, FILM COATED ORAL at 06:15

## 2019-08-18 RX ADMIN — HEPARIN SODIUM 5000 UNIT(S): 5000 INJECTION INTRAVENOUS; SUBCUTANEOUS at 17:22

## 2019-08-18 RX ADMIN — Medication 650 MILLIGRAM(S): at 17:52

## 2019-08-18 RX ADMIN — NYSTATIN CREAM 1 APPLICATION(S): 100000 CREAM TOPICAL at 06:15

## 2019-08-18 RX ADMIN — SODIUM CHLORIDE 3 MILLILITER(S): 9 INJECTION INTRAMUSCULAR; INTRAVENOUS; SUBCUTANEOUS at 06:16

## 2019-08-18 RX ADMIN — Medication 650 MILLIGRAM(S): at 17:22

## 2019-08-18 RX ADMIN — Medication 100 MILLIGRAM(S): at 06:15

## 2019-08-18 NOTE — PROGRESS NOTE ADULT - SUBJECTIVE AND OBJECTIVE BOX
INTERVAL HPI/OVERNIGHT EVENTS: I feel fine.   Vital Signs Last 24 Hrs  T(C): 36.5 (18 Aug 2019 04:24), Max: 36.9 (17 Aug 2019 16:12)  T(F): 97.7 (18 Aug 2019 04:24), Max: 98.5 (17 Aug 2019 16:12)  HR: 66 (18 Aug 2019 04:24) (58 - 86)  BP: 110/68 (18 Aug 2019 04:24) (90/56 - 111/70)  BP(mean): --  RR: 18 (18 Aug 2019 04:24) (18 - 18)  SpO2: 98% (18 Aug 2019 04:24) (92% - 98%)  I&O's Summary    17 Aug 2019 07:01  -  18 Aug 2019 07:00  --------------------------------------------------------  IN: 240 mL / OUT: 1600 mL / NET: -1360 mL      MEDICATIONS  (STANDING):  ARIPiprazole 5 milliGRAM(s) Oral daily  ascorbic acid 500 milliGRAM(s) Oral daily  aspirin enteric coated 81 milliGRAM(s) Oral daily  atorvastatin 80 milliGRAM(s) Oral at bedtime  buPROPion XL . 300 milliGRAM(s) Oral daily  clonazePAM  Tablet 2 milliGRAM(s) Oral at bedtime  clopidogrel Tablet 75 milliGRAM(s) Oral daily  docusate sodium 100 milliGRAM(s) Oral daily  DULoxetine 60 milliGRAM(s) Oral daily  famotidine    Tablet 40 milliGRAM(s) Oral daily  finasteride 5 milliGRAM(s) Oral daily  heparin  Injectable 5000 Unit(s) SubCutaneous every 12 hours  losartan 25 milliGRAM(s) Oral daily  metoprolol succinate  milliGRAM(s) Oral daily  multivitamin/minerals 1 Tablet(s) Oral daily  nystatin Cream 1 Application(s) Topical two times a day  OXcarbazepine 150 milliGRAM(s) Oral two times a day  potassium chloride    Tablet ER 40 milliEquivalent(s) Oral once  senna 1 Tablet(s) Oral at bedtime  sodium chloride 0.9% lock flush 3 milliLiter(s) IV Push every 8 hours  tamsulosin 0.4 milliGRAM(s) Oral at bedtime    MEDICATIONS  (PRN):    LABS:                        12.7   7.2   )-----------( 227      ( 18 Aug 2019 06:08 )             38.7     08-18    139  |  104  |  23  ----------------------------<  111<H>  3.8   |  25  |  1.40<H>    Ca    8.9      18 Aug 2019 06:08  Mg     1.9     08-17          CAPILLARY BLOOD GLUCOSE              REVIEW OF SYSTEMS:  CONSTITUTIONAL: No fever, weight loss, or fatigue  EYES: No eye pain, visual disturbances, or discharge  ENMT:  No difficulty hearing, tinnitus, vertigo; No sinus or throat pain  RESPIRATORY: No cough, wheezing, chills or hemoptysis; No shortness of breath  CARDIOVASCULAR: No chest pain, palpitations, dizziness, or leg swelling  GASTROINTESTINAL: No abdominal or epigastric pain. No nausea, vomiting, or hematemesis; No diarrhea or constipation. No melena or hematochezia.  GENITOURINARY: No dysuria, frequency, hematuria, or incontinence  NEUROLOGICAL: No headaches, memory loss, loss of strength, numbness, or tremors    Consultant(s) Notes Reviewed:  [x ] YES  [ ] NO    PHYSICAL EXAM:  GENERAL: NAD, well-groomed, well-developed,not in any distress ,  HEAD:  Atraumatic, Normocephalic  NECK: Supple, No JVD, Normal thyroid  NERVOUS SYSTEM:  Alert & Oriented X3, No focal deficit   CHEST/LUNG: Good air entry bilateral with no  rales, rhonchi, wheezing, or rubs  HEART: Regular rate and rhythm; No murmurs, rubs, or gallops  ABDOMEN: Soft, Nontender, Nondistended; Bowel sounds present  EXTREMITIES:  No clubbing, cyanosis, or edema  decubitus +    Care Discussed with Consultants/Other Providers [ x] YES  [ ] NO

## 2019-08-18 NOTE — CONSULT NOTE ADULT - SUBJECTIVE AND OBJECTIVE BOX
HPI: Mr. Kulkarni is a 63 year-old man with history of hypertension, type 2 daibetes mellitus, coronary artery disease s/p CABG, stroke, and severe systolic CHF (EF 10%). He is s/p admission at [facility] 8/8/19 for shortness of breath from decompensated CHF +/- pneumonia. He was transferred 8/16/19 to [facility] for cardiac cath. His creatinine was noted to be elevated at 1.4mg/dL; given his azotemia and given the plan of      PAST MEDICAL & SURGICAL HISTORY:  Benign prostatic hypertrophy  Sacral decubitus ulcer, stage III: to IV  Congestive heart failure  Obesity  Diabetes  CVA (Cerebral Infarction)  Myocardial Infarction  CAD (Coronary Artery Disease)  Hypercholesteremia  Hypertension  Depression  Tracheostomy: 3 years ago with CABG  CABG (Coronary Artery Bypass Graft): X4 vessels 3 years ago    Allergies  No Known Allergies    SOCIAL HISTORY:  Denies ETOh,Smoking,     FAMILY HISTORY:  Atrial fibrillation (Sibling)  Family history of pacemaker (Sibling)    REVIEW OF SYSTEMS:  CONSTITUTIONAL: No weakness, fevers or chills  EYES/ENT: No visual changes;  No vertigo or throat pain   NECK: No pain or stiffness  RESPIRATORY: No cough, wheezing, hemoptysis; No shortness of breath  CARDIOVASCULAR: No chest pain or palpitations  GASTROINTESTINAL: No abdominal or epigastric pain. No nausea, vomiting, or hematemesis; No diarrhea or constipation. No melena or hematochezia.  GENITOURINARY: No dysuria, frequency or hematuria  NEUROLOGICAL: No numbness or weakness  SKIN: No itching, burning, rashes, or lesions   All other review of systems is negative unless indicated above.    VITAL:  T(C): , Max: 36.9 (08-17-19 @ 16:12)  T(F): , Max: 98.5 (08-17-19 @ 16:12)  HR: 86 (08-18-19 @ 12:37)  BP: 122/75 (08-18-19 @ 12:37)  RR: 18 (08-18-19 @ 12:37)  SpO2: 96% (08-18-19 @ 12:37)    PHYSICAL EXAM:  Constitutional: NAD, Alert  HEENT: NCAT, MMM  Neck: Supple, No JVD  Respiratory: CTA-b/l  Cardiovascular: RRR s1s2, no m/r/g  Gastrointestinal: BS+, soft, NT/ND  Extremities: No peripheral edema b/l  Neurological: no focal deficits; strength grossly intact  Back: no CVAT b/l  Skin: No rashes, no nevi    LABS:               12.7   7.2   )-----------( 227      ( 18 Aug 2019 06:08 )             38.7     Na(139)/K(3.8)/Cl(104)/HCO3(25)/BUN(23)/Cr(1.40)Glu(111)/Ca(8.9)/Mg(--)/PO4(--)    08-18 @ 06:08  Na(139)/K(4.2)/Cl(104)/HCO3(26)/BUN(20)/Cr(1.29)Glu(83)/Ca(9.0)/Mg(1.9)/PO4(--)    08-17 @ 07:11    IMAGING:  < from: Xray Chest 1 View- PORTABLE-Urgent (08.16.19 @ 20:10) >  Patient is status post sternotomy. Bilateral pleural effusions, small   right and trace on the left. The heart size is mildly enlarged.      ASSESSMENT:  (1)Renal - mild azotemia - likely prerenal/cardiorenal  (2)Cardiac - Resolving CHF flare - potentially for cath this week    RECOMMEND:  (1)Continue Low-dose Cozaar as ordered  (2)No objection to Lasix - 40mg po qd  (3)Cath per Cardiology    Thank you for involving [facility] in this patient's care.    With warm regards,    Eriberto Fowler MD   [facility]  [phone] HPI: Mr. Kulkarni is a 63 year-old man with history of hypertension, type 2 diabetes mellitus, coronary artery disease s/p CABG, stroke, and severe systolic CHF (EF 10%). He is s/p admission at [facility] 8/8/19 for shortness of breath from decompensated CHF +/- pneumonia. He was transferred 8/16/19 to [facility] for cardiac cath. His creatinine was noted to be elevated at 1.4mg/dL; given his azotemia and given the plan of    Mr. Kulkarni and his sister provide history on his behalf. They tell me that that he was dialysis-dependent for about 2 months, 2 years ago. They do not know the cause of his ILANA. He tells me that he feels much better than he did on arrival to [facility]. He is voiding well and denies notable urinary changes of late.    PAST MEDICAL & SURGICAL HISTORY:  Benign prostatic hypertrophy  Sacral decubitus ulcer, stage III: to IV  Congestive heart failure  Obesity  Diabetes  CVA (Cerebral Infarction)  Myocardial Infarction  CAD (Coronary Artery Disease)  Hypercholesteremia  Hypertension  Depression  Tracheostomy: 3 years ago with CABG  CABG (Coronary Artery Bypass Graft): X4 vessels 3 years ago    Allergies  No Known Allergies    SOCIAL HISTORY:  Denies ETOh,Smoking,     FAMILY HISTORY:  Atrial fibrillation (Sibling)  Family history of pacemaker (Sibling)    REVIEW OF SYSTEMS:  CONSTITUTIONAL: No weakness, fevers or chills  EYES/ENT: No visual changes;  No vertigo or throat pain   NECK: No pain or stiffness  RESPIRATORY: No cough, wheezing, hemoptysis; No shortness of breath  CARDIOVASCULAR: No chest pain or palpitations  GASTROINTESTINAL: No abdominal or epigastric pain. No nausea, vomiting, or hematemesis; No diarrhea or constipation. No melena or hematochezia.  GENITOURINARY: No dysuria, frequency or hematuria  NEUROLOGICAL: No numbness or weakness  SKIN: No itching, burning, rashes, or lesions   All other review of systems is negative unless indicated above.    VITAL:  T(C): , Max: 36.9 (08-17-19 @ 16:12)  T(F): , Max: 98.5 (08-17-19 @ 16:12)  HR: 86 (08-18-19 @ 12:37)  BP: 122/75 (08-18-19 @ 12:37)  RR: 18 (08-18-19 @ 12:37)  SpO2: 96% (08-18-19 @ 12:37)    PHYSICAL EXAM:  Constitutional: NAD, Alert  HEENT: NCAT, DMM  Neck: Supple, No JVD  Respiratory: CTA-b/l  Cardiovascular: RRR s1s2, no m/r/g  Gastrointestinal: BS+, soft, NT/ND  Extremities: trace b/l LE edema  Neurological: (+)coarse LUE tremor  Back: no CVAT b/l  Skin: (+)fibrotic changes b/l LE    LABS:               12.7   7.2   )-----------( 227      ( 18 Aug 2019 06:08 )             38.7     Na(139)/K(3.8)/Cl(104)/HCO3(25)/BUN(23)/Cr(1.40)Glu(111)/Ca(8.9)/Mg(--)/PO4(--)    08-18 @ 06:08  Na(139)/K(4.2)/Cl(104)/HCO3(26)/BUN(20)/Cr(1.29)Glu(83)/Ca(9.0)/Mg(1.9)/PO4(--)    08-17 @ 07:11    IMAGING:  < from: Xray Chest 1 View- PORTABLE-Urgent (08.16.19 @ 20:10) >  Patient is status post sternotomy. Bilateral pleural effusions, small   right and trace on the left. The heart size is mildly enlarged.      ASSESSMENT:  (1)Renal - mild azotemia - likely prerenal/cardiorenal  (2)Cardiac - Resolving CHF flare - potentially for cath this week    RECOMMEND:  (1)Continue Low-dose Cozaar as ordered  (2)No objection to Lasix - 40mg po qd  (3)Cath per Cardiology    Thank you for involving [facility] in this patient's care.    With warm regards,    Eriberto Fowler MD   [facility]  [phone] HPI: Mr. Kulkarni is a 63 year-old man with history of hypertension, type 2 diabetes mellitus, coronary artery disease s/p CABG, stroke, and severe systolic CHF (EF 10%). He is s/p admission at [facility] 8/8/19 for shortness of breath from decompensated CHF +/- pneumonia. He was transferred 8/16/19 to [facility] for cardiac cath. His creatinine was noted to be elevated at 1.4mg/dL; given his azotemia and given the plan of    Mr. Kulkarni and his sister provide history on his behalf. They tell me that that he was dialysis-dependent for about 2 months, 2 years ago. They do not know the cause of his ILANA. He tells me that he feels much better than he did on arrival to [facility]. He is voiding well and denies notable urinary changes of late.    PAST MEDICAL & SURGICAL HISTORY:  Benign prostatic hypertrophy  Sacral decubitus ulcer, stage III: to IV  Congestive heart failure  Obesity  Diabetes  CVA (Cerebral Infarction)  Myocardial Infarction  CAD (Coronary Artery Disease)  Hypercholesteremia  Hypertension  Depression  Tracheostomy: 3 years ago with CABG  CABG (Coronary Artery Bypass Graft): X4 vessels 3 years ago    Allergies  No Known Allergies    SOCIAL HISTORY:  Denies ETOh,Smoking,     FAMILY HISTORY:  Atrial fibrillation (Sibling)  Family history of pacemaker (Sibling)    REVIEW OF SYSTEMS:  CONSTITUTIONAL: No weakness, fevers or chills  EYES/ENT: No visual changes;  No vertigo or throat pain   NECK: No pain or stiffness  RESPIRATORY: No cough, wheezing, hemoptysis; No shortness of breath  CARDIOVASCULAR: No chest pain or palpitations  GASTROINTESTINAL: No abdominal or epigastric pain. No nausea, vomiting, or hematemesis; No diarrhea or constipation. No melena or hematochezia.  GENITOURINARY: No dysuria, frequency or hematuria  NEUROLOGICAL: No numbness or weakness  SKIN: No itching, burning, rashes, or lesions   All other review of systems is negative unless indicated above.    VITAL:  T(C): , Max: 36.9 (08-17-19 @ 16:12)  T(F): , Max: 98.5 (08-17-19 @ 16:12)  HR: 86 (08-18-19 @ 12:37)  BP: 122/75 (08-18-19 @ 12:37)  RR: 18 (08-18-19 @ 12:37)  SpO2: 96% (08-18-19 @ 12:37)    PHYSICAL EXAM:  Constitutional: NAD, Alert  HEENT: NCAT, DMM  Neck: Supple, No JVD  Respiratory: CTA-b/l  Cardiovascular: RRR s1s2, no m/r/g  Gastrointestinal: BS+, soft, NT/ND  Extremities: trace b/l LE edema  Neurological: (+)coarse RUE tremor  Back: no CVAT b/l  Skin: (+)fibrotic changes b/l LE    LABS:               12.7   7.2   )-----------( 227      ( 18 Aug 2019 06:08 )             38.7     Na(139)/K(3.8)/Cl(104)/HCO3(25)/BUN(23)/Cr(1.40)Glu(111)/Ca(8.9)/Mg(--)/PO4(--)    08-18 @ 06:08  Na(139)/K(4.2)/Cl(104)/HCO3(26)/BUN(20)/Cr(1.29)Glu(83)/Ca(9.0)/Mg(1.9)/PO4(--)    08-17 @ 07:11    IMAGING:  < from: Xray Chest 1 View- PORTABLE-Urgent (08.16.19 @ 20:10) >  Patient is status post sternotomy. Bilateral pleural effusions, small   right and trace on the left. The heart size is mildly enlarged.      ASSESSMENT:  (1)Renal - mild azotemia - likely prerenal/cardiorenal  (2)Cardiac - Resolving CHF flare - potentially for cath this week    RECOMMEND:  (1)Continue Low-dose Cozaar as ordered  (2)No objection to Lasix - 40mg po qd  (3)Cath per Cardiology    Thank you for involving [facility] in this patient's care.    With warm regards,    Eriberto Fowler MD   [facility]  [phone]

## 2019-08-18 NOTE — PROGRESS NOTE ADULT - PROBLEM SELECTOR PLAN 2
- Stable.  - Continue Statin, ASA, Plavix, ARB, Toprol XL  - [facility] was reason for transfer, however, the stress test suggests mostly fixed defects so intervention unlikely to yield much benefit. Discuss with team on Monday.

## 2019-08-18 NOTE — PROGRESS NOTE ADULT - SUBJECTIVE AND OBJECTIVE BOX
Subjective: sleeping flat on his back, easily awakens to my voice, and denies any complaints.    Medications:  ARIPiprazole 5 milliGRAM(s) Oral daily  ascorbic acid 500 milliGRAM(s) Oral daily  aspirin enteric coated 81 milliGRAM(s) Oral daily  atorvastatin 80 milliGRAM(s) Oral at bedtime  buPROPion XL . 300 milliGRAM(s) Oral daily  clonazePAM  Tablet 2 milliGRAM(s) Oral at bedtime  clopidogrel Tablet 75 milliGRAM(s) Oral daily  docusate sodium 100 milliGRAM(s) Oral daily  DULoxetine 60 milliGRAM(s) Oral daily  famotidine    Tablet 40 milliGRAM(s) Oral daily  finasteride 5 milliGRAM(s) Oral daily  heparin  Injectable 5000 Unit(s) SubCutaneous every 12 hours  losartan 25 milliGRAM(s) Oral daily  metoprolol succinate  milliGRAM(s) Oral daily  multivitamin/minerals 1 Tablet(s) Oral daily  nystatin Cream 1 Application(s) Topical two times a day  OXcarbazepine 150 milliGRAM(s) Oral two times a day  potassium chloride    Tablet ER 40 milliEquivalent(s) Oral once  senna 1 Tablet(s) Oral at bedtime  sodium chloride 0.9% lock flush 3 milliLiter(s) IV Push every 8 hours  tamsulosin 0.4 milliGRAM(s) Oral at bedtime    Vitals:  T(C): 36.5 (08-18-19 @ 04:24), Max: 36.9 (08-17-19 @ 16:12)  HR: 66 (08-18-19 @ 04:24) (58 - 86)  BP: 110/68 (08-18-19 @ 04:24) (90/56 - 111/70)  RR: 18 (08-18-19 @ 04:24) (18 - 18)  SpO2: 98% (08-18-19 @ 04:24) (92% - 98%)    Weight (kg): 90.7 (08-16 @ 14:53)    I&O's Summary    17 Aug 2019 07:01  -  18 Aug 2019 07:00  --------------------------------------------------------  IN: 240 mL / OUT: 1600 mL / NET: -1360 mL        Physical Exam:  Appearance: No Acute Distress  HEENT: JVP 6 cm H2O, no HJR  Cardiovascular: RRR, Normal S1 S2, No murmurs/rubs/gallops  Respiratory: Clear to auscultation bilaterally  Gastrointestinal: Soft, Non-tender, non-distended	  Skin: no skin lesions  Neurologic: Non-focal  Extremities: No LE edema, warm and well perfused  Psychiatry: A & O x 3, Mood & affect appropriate      Labs:                        12.7   7.2   )-----------( 227      ( 18 Aug 2019 06:08 )             38.7     08-18    139  |  104  |  23  ----------------------------<  111<H>  3.8   |  25  |  1.40<H>    Ca    8.9      18 Aug 2019 06:08  Mg     1.9     08-17

## 2019-08-18 NOTE — PROGRESS NOTE ADULT - PROBLEM SELECTOR PLAN 1
- Stable and well compensated.  - No further diuretics today.  - Continue losartan 25 mg once daily, hold SBP < 90. Goal is to transition to Entreso once off Lisinopril 36 h (last dose 8/17 6 am).  - Give KCl 40 mEq po once with next meal for K 3.8. Deferring addition of spironolactone as he is not currently requiring a loop diuertic.  - TTE requested here as the last one was in 2014.

## 2019-08-19 ENCOUNTER — TRANSCRIPTION ENCOUNTER (OUTPATIENT)
Age: 64
End: 2019-08-19

## 2019-08-19 LAB
ANION GAP SERPL CALC-SCNC: 10 MMOL/L — SIGNIFICANT CHANGE UP (ref 5–17)
BUN SERPL-MCNC: 24 MG/DL — HIGH (ref 7–23)
CALCIUM SERPL-MCNC: 9.6 MG/DL — SIGNIFICANT CHANGE UP (ref 8.4–10.5)
CHLORIDE SERPL-SCNC: 103 MMOL/L — SIGNIFICANT CHANGE UP (ref 96–108)
CO2 SERPL-SCNC: 26 MMOL/L — SIGNIFICANT CHANGE UP (ref 22–31)
CREAT SERPL-MCNC: 1.24 MG/DL — SIGNIFICANT CHANGE UP (ref 0.5–1.3)
GLUCOSE SERPL-MCNC: 94 MG/DL — SIGNIFICANT CHANGE UP (ref 70–99)
MAGNESIUM SERPL-MCNC: 2 MG/DL — SIGNIFICANT CHANGE UP (ref 1.6–2.6)
POTASSIUM SERPL-MCNC: 4.2 MMOL/L — SIGNIFICANT CHANGE UP (ref 3.5–5.3)
POTASSIUM SERPL-SCNC: 4.2 MMOL/L — SIGNIFICANT CHANGE UP (ref 3.5–5.3)
SODIUM SERPL-SCNC: 139 MMOL/L — SIGNIFICANT CHANGE UP (ref 135–145)

## 2019-08-19 RX ADMIN — CLOPIDOGREL BISULFATE 75 MILLIGRAM(S): 75 TABLET, FILM COATED ORAL at 11:42

## 2019-08-19 RX ADMIN — Medication 1 TABLET(S): at 11:42

## 2019-08-19 RX ADMIN — SODIUM CHLORIDE 3 MILLILITER(S): 9 INJECTION INTRAMUSCULAR; INTRAVENOUS; SUBCUTANEOUS at 13:24

## 2019-08-19 RX ADMIN — DULOXETINE HYDROCHLORIDE 60 MILLIGRAM(S): 30 CAPSULE, DELAYED RELEASE ORAL at 11:42

## 2019-08-19 RX ADMIN — NYSTATIN CREAM 1 APPLICATION(S): 100000 CREAM TOPICAL at 05:22

## 2019-08-19 RX ADMIN — ARIPIPRAZOLE 5 MILLIGRAM(S): 15 TABLET ORAL at 11:42

## 2019-08-19 RX ADMIN — FAMOTIDINE 40 MILLIGRAM(S): 10 INJECTION INTRAVENOUS at 11:43

## 2019-08-19 RX ADMIN — OXCARBAZEPINE 150 MILLIGRAM(S): 300 TABLET, FILM COATED ORAL at 05:22

## 2019-08-19 RX ADMIN — OXCARBAZEPINE 150 MILLIGRAM(S): 300 TABLET, FILM COATED ORAL at 17:44

## 2019-08-19 RX ADMIN — Medication 650 MILLIGRAM(S): at 06:00

## 2019-08-19 RX ADMIN — HEPARIN SODIUM 5000 UNIT(S): 5000 INJECTION INTRAVENOUS; SUBCUTANEOUS at 17:44

## 2019-08-19 RX ADMIN — Medication 650 MILLIGRAM(S): at 05:23

## 2019-08-19 RX ADMIN — Medication 100 MILLIGRAM(S): at 05:23

## 2019-08-19 RX ADMIN — SENNA PLUS 1 TABLET(S): 8.6 TABLET ORAL at 21:09

## 2019-08-19 RX ADMIN — FINASTERIDE 5 MILLIGRAM(S): 5 TABLET, FILM COATED ORAL at 11:42

## 2019-08-19 RX ADMIN — TAMSULOSIN HYDROCHLORIDE 0.4 MILLIGRAM(S): 0.4 CAPSULE ORAL at 21:09

## 2019-08-19 RX ADMIN — Medication 500 MILLIGRAM(S): at 11:42

## 2019-08-19 RX ADMIN — ATORVASTATIN CALCIUM 80 MILLIGRAM(S): 80 TABLET, FILM COATED ORAL at 21:10

## 2019-08-19 RX ADMIN — Medication 650 MILLIGRAM(S): at 21:09

## 2019-08-19 RX ADMIN — Medication 81 MILLIGRAM(S): at 11:42

## 2019-08-19 RX ADMIN — BUPROPION HYDROCHLORIDE 300 MILLIGRAM(S): 150 TABLET, EXTENDED RELEASE ORAL at 14:12

## 2019-08-19 RX ADMIN — NYSTATIN CREAM 1 APPLICATION(S): 100000 CREAM TOPICAL at 17:44

## 2019-08-19 RX ADMIN — LOSARTAN POTASSIUM 25 MILLIGRAM(S): 100 TABLET, FILM COATED ORAL at 05:22

## 2019-08-19 RX ADMIN — SODIUM CHLORIDE 3 MILLILITER(S): 9 INJECTION INTRAMUSCULAR; INTRAVENOUS; SUBCUTANEOUS at 06:13

## 2019-08-19 RX ADMIN — Medication 650 MILLIGRAM(S): at 22:58

## 2019-08-19 RX ADMIN — Medication 2 MILLIGRAM(S): at 21:10

## 2019-08-19 RX ADMIN — SODIUM CHLORIDE 3 MILLILITER(S): 9 INJECTION INTRAMUSCULAR; INTRAVENOUS; SUBCUTANEOUS at 21:12

## 2019-08-19 RX ADMIN — Medication 100 MILLIGRAM(S): at 11:42

## 2019-08-19 RX ADMIN — HEPARIN SODIUM 5000 UNIT(S): 5000 INJECTION INTRAVENOUS; SUBCUTANEOUS at 05:23

## 2019-08-19 NOTE — DISCHARGE NOTE PROVIDER - CARE PROVIDER_API CALL
Eriberto Fowler (MD)  Internal Medicine; Nephrology  [address] [address]  Phone: [phone]  Fax: [phone]  Follow Up Time:     Ayaan Santiago; PhD)  Adv Heart Fail Trnsplnt Cardio; Cardiovascular Disease; Internal Medicine  [address] [place] [address]  Phone: [phone]  Fax: [phone]  Follow Up Time: Eriberto Fowler (MD)  Internal Medicine; Nephrology  [address] [facility] [address]  [place] [address]  Phone: [phone]  Fax: [phone]  Follow Up Time:     Ayaan Santiago; PhD)  Adv Heart Fail Trnsplnt Cardio; Cardiovascular Disease; Internal Medicine  [address] [place] [address]  Phone: [phone]  Fax: [phone]  Follow Up Time:     HEART FAILURE CLINIC APPOINTMENT ON 9/919 @ 10 AM- CALL TO CONFIRM,   Phone: (   )    -  Fax: (   )    -  Follow Up Time:     Hortensia Padron)  Cardiovascular Disease; Interventional Cardiology  [address] [place] [address]  Phone: [phone]  Fax: [phone]  Follow Up Time:

## 2019-08-19 NOTE — DISCHARGE NOTE PROVIDER - HOSPITAL COURSE
This is a pleasant 62 yo M with ICM, LVEF 10-15%, and severe LV enlargement on recent nuclear stress test who was admitted for cardiac cath. The HF service was consulted to provide medical optimization prior to considering an intervention. He has significant functional limitation due to prior stroke and residual weakness, but he can get around the home using a walker with everything on a single level. He has had a chronic indwelling Pro catheter for a few months according to his sister for "an infection." He has sacral decubiti. He has no overt volume overload.         Problem/Plan - 1:    ·  Problem: Acute on chronic systolic heart failure.  Plan: - Stable and well compensated.    - TTE with EF 20%, mild MR, eccentric LVH, severe global LV systolic dysfunction    - Cardiac cath with coronary artery disease and elevated left sided pressures    - will defer intervention and continue with medical optimization as an outpatient    - continue lasix 20 mg daily     - continue entresto 24-26 mg BID    - A follow up appointment with the Heart Failure Clinic has been scheduled for 9/9/19 at 10:00 am at [facility]. Office Number is [phone].    - follow up as an outpatient with Dr. Padron.          Problem/Plan - 2:    ·  Problem: Left ventricular mural thrombus.  Plan: TTE performed on 8/20 suggestive of apical, mural thrombus    - continue eliquis 5 mg BID.          Problem/Plan - 3:    ·  Problem: Coronary artery disease involving native coronary artery of native heart, angina presence unspecified.  Plan: - Stable. Not endorsing chest pain.    - Continue statin, plavix, and toprol.          Problem/Plan - 4:    ·  Problem: Essential hypertension.  Plan: - normotensive    - continue with plan as above.          Problem/Plan - 5:    ·  Problem: Urinary retention.  Plan: - Unclear history for placement of Pro.    - Per family, plan for outpatient evaluation.    - Continue flomax and proscar. This is a pleasant 62 yo M with ICM, LVEF 10-15%, and severe LV enlargement on recent nuclear stress test who was admitted for cardiac cath. The HF service was consulted to provide medical optimization prior to considering an intervention. He has significant functional limitation due to prior stroke and residual weakness, but he can get around the home using a walker with everything on a single level. He has had a chronic indwelling Pro catheter for a few months according to his sister for "an infection." He has sacral decubiti. He has no overt volume overload.         Problem/Plan - 1:    ·  Problem: Acute on chronic systolic heart failure.  Plan: - Stable and well compensated.    - TTE with EF 20%, mild MR, eccentric LVH, severe global LV systolic dysfunction    - Cardiac cath with coronary artery disease and elevated left sided pressures    - will defer intervention and continue with medical optimization as an outpatient    - continue lasix 20 mg daily     - continue entresto 24-26 mg BID    - A follow up appointment with the Heart Failure Clinic has been scheduled for 9/9/19 at 10:00 am at [facility]. Office Number is [phone].    - follow up as an outpatient with Dr. Padron.          Problem/Plan - 2:    ·  Problem: Left ventricular mural thrombus.  Plan: TTE performed on 8/20 suggestive of apical, mural thrombus    - continue eliquis 5 mg BID.          Problem/Plan - 3:    ·  Problem: Coronary artery disease involving native coronary artery of native heart, angina presence unspecified.  Plan: - Stable. Not endorsing chest pain.    - Continue statin, plavix 75 mg , and toprol  mg daily.          Problem/Plan - 4:    ·  Problem: Essential hypertension.  Plan: - normotensive    - continue with plan as above.          Problem/Plan - 5:    ·  Problem: Urinary retention.  Plan: - Unclear history for placement of Pro.    - Per family, plan for outpatient evaluation.    - Continue flomax and proscar. This is a pleasant 64 yo M with ICM, LVEF 10-15%, and severe LV enlargement on recent nuclear stress test who was admitted for cardiac cath. The HF service was consulted to provide medical optimization prior to considering an intervention. He has significant functional limitation due to prior stroke and residual weakness, but he can get around the home using a walker with everything on a single level. He has had a chronic indwelling Pro catheter for a few months according to his sister for "an infection." He has sacral decubiti. He has no overt volume overload.         Problem/Plan - 1:    ·  Problem: Acute on chronic systolic heart failure.  Plan: - Stable and well compensated.    - TTE with EF 20%, mild MR, eccentric LVH, severe global LV systolic dysfunction    - Cardiac cath with coronary artery disease and elevated left sided pressures    - will defer intervention and continue with medical optimization as an outpatient    - continue lasix 20 mg daily     - continue entresto 24-26 mg BID    - A follow up appointment with the Heart Failure Clinic has been scheduled for 9/9/19 at 10:00 am at [facility]. Office Number is [phone].    - follow up as an outpatient with Dr. Padron.          Problem/Plan - 2:    ·  Problem: Left ventricular mural thrombus.  Plan: TTE performed on 8/20 suggestive of apical, mural thrombus    - continue eliquis 5 mg BID.          Problem/Plan - 3:    ·  Problem: Coronary artery disease involving native coronary artery of native heart, angina presence unspecified.  Plan: - Stable. Not endorsing chest pain.    - Continue statin, plavix 75 mg , and toprol  mg daily.          Problem/Plan - 4:    ·  Problem: Essential hypertension.  Plan: - normotensive    - continue with plan as above.          Problem/Plan - 5:    ·  Problem: Urinary retention.  Plan: - Unclear history for placement of Pro.    - Per family, plan for outpatient evaluation.    - Continue flomax and proscar.         cleared for discharge by Dr Moran

## 2019-08-19 NOTE — PROGRESS NOTE ADULT - SUBJECTIVE AND OBJECTIVE BOX
INTERVAL HPI/OVERNIGHT EVENTS: I feel fine so want to go home.   Vital Signs Last 24 Hrs  T(C): 36.7 (19 Aug 2019 11:35), Max: 36.7 (19 Aug 2019 04:32)  T(F): 98.1 (19 Aug 2019 11:35), Max: 98.1 (19 Aug 2019 11:35)  HR: 74 (19 Aug 2019 11:35) (69 - 90)  BP: 108/68 (19 Aug 2019 11:35) (105/62 - 113/71)  BP(mean): --  RR: 18 (19 Aug 2019 11:35) (18 - 18)  SpO2: 99% (19 Aug 2019 11:35) (95% - 99%)  I&O's Summary    18 Aug 2019 07:01  -  19 Aug 2019 07:00  --------------------------------------------------------  IN: 720 mL / OUT: 1000 mL / NET: -280 mL    19 Aug 2019 07:01  -  19 Aug 2019 14:43  --------------------------------------------------------  IN: 600 mL / OUT: 0 mL / NET: 600 mL      MEDICATIONS  (STANDING):  ARIPiprazole 5 milliGRAM(s) Oral daily  ascorbic acid 500 milliGRAM(s) Oral daily  aspirin enteric coated 81 milliGRAM(s) Oral daily  atorvastatin 80 milliGRAM(s) Oral at bedtime  buPROPion XL . 300 milliGRAM(s) Oral daily  clonazePAM  Tablet 2 milliGRAM(s) Oral at bedtime  clopidogrel Tablet 75 milliGRAM(s) Oral daily  docusate sodium 100 milliGRAM(s) Oral daily  DULoxetine 60 milliGRAM(s) Oral daily  famotidine    Tablet 40 milliGRAM(s) Oral daily  finasteride 5 milliGRAM(s) Oral daily  heparin  Injectable 5000 Unit(s) SubCutaneous every 12 hours  losartan 25 milliGRAM(s) Oral daily  metoprolol succinate  milliGRAM(s) Oral daily  multivitamin/minerals 1 Tablet(s) Oral daily  nystatin Cream 1 Application(s) Topical two times a day  OXcarbazepine 150 milliGRAM(s) Oral two times a day  senna 1 Tablet(s) Oral at bedtime  sodium chloride 0.9% lock flush 3 milliLiter(s) IV Push every 8 hours  tamsulosin 0.4 milliGRAM(s) Oral at bedtime    MEDICATIONS  (PRN):  acetaminophen   Tablet .. 650 milliGRAM(s) Oral every 6 hours PRN Mild Pain (1 - 3)    LABS:                        12.7   7.2   )-----------( 227      ( 18 Aug 2019 06:08 )             38.7     08-19    139  |  103  |  24<H>  ----------------------------<  94  4.2   |  26  |  1.24    Ca    9.6      19 Aug 2019 06:33  Mg     2.0     08-19          CAPILLARY BLOOD GLUCOSE              REVIEW OF SYSTEMS:  CONSTITUTIONAL: No fever, weight loss, or fatigue  EYES: No eye pain, visual disturbances, or discharge  ENMT:  No difficulty hearing, tinnitus, vertigo; No sinus or throat pain  NECK: No pain or stiffness  RESPIRATORY: No cough, wheezing, chills or hemoptysis; No shortness of breath  CARDIOVASCULAR: No chest pain, palpitations, dizziness, or leg swelling  GASTROINTESTINAL: No abdominal or epigastric pain. No nausea, vomiting, or hematemesis; No diarrhea or constipation. No melena or hematochezia.  GENITOURINARY: No dysuria, frequency, hematuria, or incontinence  NEUROLOGICAL: No headaches, memory loss, loss of strength, numbness, or tremors    Consultant(s) Notes Reviewed:  [x ] YES  [ ] NO    PHYSICAL EXAM:  GENERAL: NAD, well-groomed, well-developed, not in any distress ,  HEAD:  Atraumatic, Normocephalic  EYES: EOMI, PERRLA, conjunctiva and sclera clear  NECK: Supple, No JVD, Normal thyroid  NERVOUS SYSTEM:  Alert & Oriented X3,  CHEST/LUNG: Good air entry bilateral with no  rales, rhonchi, wheezing, or rubs  HEART: Regular rate and rhythm; No murmurs, rubs, or gallops  ABDOMEN: Soft, Nontender, Nondistended; Bowel sounds present  EXTREMITIES:  2+ Peripheral Pulses, No clubbing, cyanosis, or edema    Care Discussed with Consultants/Other Providers [ x] YES  [ ] NO

## 2019-08-19 NOTE — DISCHARGE NOTE PROVIDER - CARE PROVIDERS DIRECT ADDRESSES
,DirectAddress_Unknown,[email or web address] ,DirectAddress_Unknown,[email or web address],DirectAddress_Unknown,[email or web address]

## 2019-08-19 NOTE — DISCHARGE NOTE PROVIDER - PROVIDER TOKENS
PROVIDER:[TOKEN:[[id number]]],PROVIDER:[TOKEN:[[id number]]] PROVIDER:[TOKEN:[[id number]]],PROVIDER:[TOKEN:[[id number]]],FREE:[LAST:[HEART FAILURE CLINIC APPOINTMENT ON 9/919 @ 10 AM- CALL TO CONFIRM],PHONE:[(   )    -],FAX:[(   )    -]],PROVIDER:[TOKEN:[[id number]]]

## 2019-08-19 NOTE — PROGRESS NOTE ADULT - SUBJECTIVE AND OBJECTIVE BOX
No pain, no shortness of breath      VITAL:  T(C): , Max: 36.8 (08-18-19 @ 12:37)  T(F): , Max: 98.2 (08-18-19 @ 12:37)  HR: 90 (08-19-19 @ 08:52)  BP: 105/69 (08-19-19 @ 08:52)  BP(mean): --  RR: 18 (08-19-19 @ 08:52)  SpO2: 95% (08-19-19 @ 08:52)      PHYSICAL EXAM:  Constitutional: NAD, Alert  HEENT: NCAT, DMM  Neck: Supple, No JVD  Respiratory: CTA-b/l  Cardiovascular: RRR s1s2, no m/r/g  Gastrointestinal: BS+, soft, NT/ND  Extremities: trace b/l LE edema  Neurological: (+)coarse LUE tremor  Back: no CVAT b/l  Skin: (+)fibrotic changes b/l LE      LABS:                        12.7   7.2   )-----------( 227      ( 18 Aug 2019 06:08 )             38.7     Na(139)/K(4.2)/Cl(103)/HCO3(26)/BUN(24)/Cr(1.24)Glu(94)/Ca(9.6)/Mg(2.0)/PO4(--)    08-19 @ 06:33  Na(139)/K(3.8)/Cl(104)/HCO3(25)/BUN(23)/Cr(1.40)Glu(111)/Ca(8.9)/Mg(--)/PO4(--)    08-18 @ 06:08  Na(139)/K(4.2)/Cl(104)/HCO3(26)/BUN(20)/Cr(1.29)Glu(83)/Ca(9.0)/Mg(1.9)/PO4(--)    08-17 @ 07:11      IMPRESSION: 63M w/ HTN, DM2, CAD-CABG, CVA, and HFrEF (10%), 8/16/19 from OSH s/p decompensated CHF and potentially for cardiac cath  (1)Renal - mild azotemia as of yesterday - prerenal - improved as of today  (2)Cardiac - Resolving CHF flare - potentially for cath this week    RECOMMEND:  (1)Continue Low-dose Cozaar as ordered  (2)No objection to Lasix - 40mg po qd  (3)Cath per Cardiology                Eriberto Fowler MD  [facility]  [phone] No pain, no shortness of breath      VITAL:  T(C): , Max: 36.8 (08-18-19 @ 12:37)  T(F): , Max: 98.2 (08-18-19 @ 12:37)  HR: 90 (08-19-19 @ 08:52)  BP: 105/69 (08-19-19 @ 08:52)  RR: 18 (08-19-19 @ 08:52)  SpO2: 95% (08-19-19 @ 08:52)      PHYSICAL EXAM:  Constitutional: NAD, Alert  HEENT: NCAT, DMM  Neck: Supple, No JVD  Respiratory: CTA-b/l  Cardiovascular: RRR s1s2, no m/r/g  Gastrointestinal: BS+, soft, NT/ND  Extremities: trace b/l LE edema  Neurological: (+)coarse RUE tremor; contracted right hand  Back: no CVAT b/l  Skin: (+)fibrotic changes b/l LE      LABS:                        12.7   7.2   )-----------( 227      ( 18 Aug 2019 06:08 )             38.7     Na(139)/K(4.2)/Cl(103)/HCO3(26)/BUN(24)/Cr(1.24)Glu(94)/Ca(9.6)/Mg(2.0)/PO4(--)    08-19 @ 06:33  Na(139)/K(3.8)/Cl(104)/HCO3(25)/BUN(23)/Cr(1.40)Glu(111)/Ca(8.9)/Mg(--)/PO4(--)    08-18 @ 06:08  Na(139)/K(4.2)/Cl(104)/HCO3(26)/BUN(20)/Cr(1.29)Glu(83)/Ca(9.0)/Mg(1.9)/PO4(--)    08-17 @ 07:11      IMPRESSION: 63M w/ HTN, DM2, CAD-CABG, CVA, and HFrEF (10%), 8/16/19 from OSH s/p decompensated CHF and potentially for cardiac cath  (1)Renal - mild azotemia as of yesterday - prerenal - improved as of today  (2)Cardiac - Resolving CHF flare - potentially for cath this week    RECOMMEND:  (1)Continue Low-dose Cozaar as ordered  (2)No objection to Lasix - 40mg po qd  (3)Cath per Cardiology                Eriberto Fowler MD  [facility]  [phone]

## 2019-08-19 NOTE — PROGRESS NOTE ADULT - SUBJECTIVE AND OBJECTIVE BOX
Pt seen and examined.   Consult dictated.       Wound bed with granulation tissue.   Stool soilage in and around the vicinity of the wound.   No evidence of infection.

## 2019-08-19 NOTE — DISCHARGE NOTE PROVIDER - NSDCCPCAREPLAN_GEN_ALL_CORE_FT
PRINCIPAL DISCHARGE DIAGNOSIS  Diagnosis: Coronary artery disease involving native coronary artery of native heart, angina presence unspecified  Assessment and Plan of Treatment: Coronary artery disease is a condition where the arteries the supply the heart muscle get clogges with fatty deposits & puts you at risk for a heart attack  Call your doctor if you have any new pain, pressure, or discomfort in the center of your chest, pain, tingling or discomfort in arms, back, neck, jaw, or stomach, shortness of breath, nausea, vomiting, burping or heartburn, sweating, cold and clammy skin, racing or abnormal heartbeat for more than 10 minutes or if they keep coming & going.  Call 911 and do not tr to get to hospital by care  You can help yourself with lefestyle changes (quitting smoking if you smoke), eat lots of fruits & vegetables & low fat dairy products, not a lot of meat & fatty foods, walk or some form of physical activity most days of the week, lose weight if you are overweight  Take your cardiac medication as prescribed to lower cholesterol, to lower blood pressure, aspirin to prevent blood clots, and diabetes control  Make sure to keep appointments with doctor for cardiac follow up care        SECONDARY DISCHARGE DIAGNOSES  Diagnosis: Urinary retention  Assessment and Plan of Treatment: Continue with Pro    Diagnosis: Acute on chronic systolic heart failure  Assessment and Plan of Treatment: Weigh yourself daily.  If you gain 3lbs in 3 days, or 5lbs in a week call your Health Care Provider.  Do not eat or drink foods containing more than 2000mg of salt (sodium) in your diet every day.  Call your Health Care Provider if you have any swelling or increased swelling in your feet, ankles, and/or stomach.  Take all of your medication as directed.  If you become dizzy call your Health Care Provider.      Diagnosis: Sacral decubitus ulcer, stage IV  Assessment and Plan of Treatment: Cleanse wound w/ NS, cavalon to periwound, gently pack w/ moistened guaze w/ DSD overlay and secure w/ thin film.   Dressing to be changed daily or prn due to soiling. Cont to off load sacrum be turning every 2 hrs    Diagnosis: Coronary artery disease involving native coronary artery of native heart, angina presence unspecified  Assessment and Plan of Treatment: Coronary artery disease is a condition where the arteries the supply the heart muscle get clogges with fatty deposits & puts you at risk for a heart attack  Call your doctor if you have any new pain, pressure, or discomfort in the center of your chest, pain, tingling or discomfort in arms, back, neck, jaw, or stomach, shortness of breath, nausea, vomiting, burping or heartburn, sweating, cold and clammy skin, racing or abnormal heartbeat for more than 10 minutes or if they keep coming & going.  Call 911 and do not tr to get to hospital by care  You can help yourself with lefestyle changes (quitting smoking if you smoke), eat lots of fruits & vegetables & low fat dairy products, not a lot of meat & fatty foods, walk or some form of physical activity most days of the week, lose weight if you are overweight  Take your cardiac medication as prescribed to lower cholesterol, to lower blood pressure, aspirin to prevent blood clots, and diabetes control  Make sure to keep appointments with doctor for cardiac follow up care PRINCIPAL DISCHARGE DIAGNOSIS  Diagnosis: Coronary artery disease involving native coronary artery of native heart, angina presence unspecified  Assessment and Plan of Treatment: - continue entresto 24-26 mg BID  - A follow up appointment with the [facility] has been scheduled for 9/9/19 at 10:00 am at [facility]. Office Number is [phone].- follow up as an outpatient with Dr. Padron.   Coronary artery disease is a condition where the arteries the supply the heart muscle get clogges with fatty deposits & puts you at risk for a heart attack  Call your doctor if you have any new pain, pressure, or discomfort in the center of your chest, pain, tingling or discomfort in arms, back, neck, jaw, or stomach, shortness of breath, nausea, vomiting, burping or heartburn, sweating, cold and clammy skin, racing or abnormal heartbeat for more than 10 minutes or if they keep coming & going.  Call 911 and do not tr to get to hospital by care  You can help yourself with lefestyle changes (quitting smoking if you smoke), eat lots of fruits & vegetables & low fat dairy products, not a lot of meat & fatty foods, walk or some form of physical activity most days of the week, lose weight if you are overweight  Take your cardiac medication as prescribed to lower cholesterol, to lower blood pressure, aspirin to prevent blood clots, and diabetes control  Make sure to keep appointments with doctor for cardiac follow up care        SECONDARY DISCHARGE DIAGNOSES  Diagnosis: Acute on chronic systolic heart failure  Assessment and Plan of Treatment: Weigh yourself daily.  If you gain 3lbs in 3 days, or 5lbs in a week call your Health Care Provider.  Do not eat or drink foods containing more than 2000mg of salt (sodium) in your diet every day.  Call your Health Care Provider if you have any swelling or increased swelling in your feet, ankles, and/or stomach.  Take all of your medication as directed.  If you become dizzy call your Health Care Provider.      Diagnosis: Urinary retention  Assessment and Plan of Treatment: Continue with Pro    Diagnosis: Coronary artery disease involving native coronary artery of native heart, angina presence unspecified  Assessment and Plan of Treatment: Coronary artery disease is a condition where the arteries the supply the heart muscle get clogges with fatty deposits & puts you at risk for a heart attack  Call your doctor if you have any new pain, pressure, or discomfort in the center of your chest, pain, tingling or discomfort in arms, back, neck, jaw, or stomach, shortness of breath, nausea, vomiting, burping or heartburn, sweating, cold and clammy skin, racing or abnormal heartbeat for more than 10 minutes or if they keep coming & going.  Call 911 and do not tr to get to hospital by care  You can help yourself with lefestyle changes (quitting smoking if you smoke), eat lots of fruits & vegetables & low fat dairy products, not a lot of meat & fatty foods, walk or some form of physical activity most days of the week, lose weight if you are overweight  Take your cardiac medication as prescribed to lower cholesterol, to lower blood pressure, aspirin to prevent blood clots, and diabetes control  Make sure to keep appointments with doctor for cardiac follow up care      Diagnosis: Sacral decubitus ulcer, stage IV  Assessment and Plan of Treatment: Cleanse wound w/ NS, cavalon to periwound, gently pack w/ moistened guaze w/ DSD overlay and secure w/ thin film.   Dressing to be changed daily or prn due to soiling. Cont to off load sacrum be turning every 2 hrs PRINCIPAL DISCHARGE DIAGNOSIS  Diagnosis: Systolic CHF, acute on chronic  Assessment and Plan of Treatment: Continue entresto 24-26 mg BID  A follow up appointment with the [facility] Failure Clinic has been scheduled for 9/9/19 at 10:00 am at [facility]. Office Number is [phone].- follow up as an outpatient with Dr. Padron.   Weigh yourself daily.  If you gain 3lbs in 3 days, or 5lbs in a week call your Health Care Provider.  Do not eat or drink foods containing more than 2000mg of salt (sodium) in your diet every day.  Call your Health Care Provider if you have any swelling or increased swelling in your feet, ankles, and/or stomach.  The Pt was provided with CHF diet instruction (low sodium diet, daily weights, label reading, Heart Healthy Cooking Tips & Heart Healthy shopping Tips).  Take all of your medication as directed.  If you become dizzy call your Health Care Provider.        SECONDARY DISCHARGE DIAGNOSES  Diagnosis: Type 2 diabetes mellitus  Assessment and Plan of Treatment: HgA1C this admission was 5.3.  Make sure you get your HgA1c checked every three months.  If you take oral diabetes medications, check your blood glucose two times a day.  If you take insulin, check your blood glucose before meals and at bedtime.  It's important not to skip any meals.  Keep a log of your blood glucose results and always take it with you to your doctor appointments.  Keep a list of your current medications including injectables and over the counter medications and bring this medication list with you to all your doctor appointments.  If you have not seen your ophthalmologist this year call for appointment.  Check your feet daily for redness, sores, or openings. Do not self treat. If no improvement in two days call your primary care physician for an appointment.  Low blood sugar (hypoglycemia) is a blood sugar below 70mg/dl. Check your blood sugar if you feel signs/symptoms of hypoglycemia. If your blood sugar is below 70 take 15 grams of carbohydrates (ex 4 oz of apple juice, 3-4 glucose tablets, or 4-6 oz of regular soda) wait 15 minutes and repeat blood sugar to make sure it comes up above 70.  If your blood sugar is above 70 and you are due for a meal, have a meal.  If you are not due for a meal have a snack.  This snack helps keeps your blood sugar at a safe range.      Diagnosis: Hypertension  Assessment and Plan of Treatment: Low salt diet  Activity as tolerated.  Take all medication as prescribed.  Follow up with your medical doctor for routine blood pressure monitoring at your next visit.  Notify your doctor if you have any of the following symptoms:   Dizziness, Lightheadedness, Blurry vision, Headache, Chest pain, Shortness of breath      Diagnosis: Left ventricular mural thrombus  Assessment and Plan of Treatment: Continue to take your blood thinner.  It is very important that you do not stop unless you are instructed to do so by your physician.    Diagnosis: Urinary retention  Assessment and Plan of Treatment: Continue with Pro    Diagnosis: Coronary artery disease involving native coronary artery of native heart, angina presence unspecified  Assessment and Plan of Treatment: Coronary artery disease is a condition where the arteries the supply the heart muscle get clogges with fatty deposits & puts you at risk for a heart attack  Call your doctor if you have any new pain, pressure, or discomfort in the center of your chest, pain, tingling or discomfort in arms, back, neck, jaw, or stomach, shortness of breath, nausea, vomiting, burping or heartburn, sweating, cold and clammy skin, racing or abnormal heartbeat for more than 10 minutes or if they keep coming & going.  Call 911 and do not tr to get to hospital by care  You can help yourself with lefestyle changes (quitting smoking if you smoke), eat lots of fruits & vegetables & low fat dairy products, not a lot of meat & fatty foods, walk or some form of physical activity most days of the week, lose weight if you are overweight  Take your cardiac medication as prescribed to lower cholesterol, to lower blood pressure, aspirin to prevent blood clots, and diabetes control  Make sure to keep appointments with doctor for cardiac follow up care      Diagnosis: Sacral decubitus ulcer, stage IV  Assessment and Plan of Treatment: Cleanse wound w/ NS, cavalon to periwound, gently pack w/ moistened guaze w/ DSD overlay and secure w/ thin film.   Dressing to be changed daily or prn due to soiling. Cont to off load sacrum be turning every 2 hrs

## 2019-08-20 LAB — APTT BLD: 32.1 SEC — SIGNIFICANT CHANGE UP (ref 27.5–36.3)

## 2019-08-20 PROCEDURE — 99233 SBSQ HOSP IP/OBS HIGH 50: CPT | Mod: GC

## 2019-08-20 PROCEDURE — 93306 TTE W/DOPPLER COMPLETE: CPT | Mod: 26

## 2019-08-20 RX ORDER — HEPARIN SODIUM 5000 [USP'U]/ML
7500 INJECTION INTRAVENOUS; SUBCUTANEOUS EVERY 6 HOURS
Refills: 0 | Status: DISCONTINUED | OUTPATIENT
Start: 2019-08-20 | End: 2019-08-22

## 2019-08-20 RX ORDER — HEPARIN SODIUM 5000 [USP'U]/ML
INJECTION INTRAVENOUS; SUBCUTANEOUS
Qty: 25000 | Refills: 0 | Status: DISCONTINUED | OUTPATIENT
Start: 2019-08-20 | End: 2019-08-22

## 2019-08-20 RX ORDER — HEPARIN SODIUM 5000 [USP'U]/ML
3500 INJECTION INTRAVENOUS; SUBCUTANEOUS EVERY 6 HOURS
Refills: 0 | Status: DISCONTINUED | OUTPATIENT
Start: 2019-08-20 | End: 2019-08-22

## 2019-08-20 RX ORDER — FUROSEMIDE 40 MG
40 TABLET ORAL DAILY
Refills: 0 | Status: DISCONTINUED | OUTPATIENT
Start: 2019-08-20 | End: 2019-08-21

## 2019-08-20 RX ADMIN — Medication 40 MILLIGRAM(S): at 12:46

## 2019-08-20 RX ADMIN — NYSTATIN CREAM 1 APPLICATION(S): 100000 CREAM TOPICAL at 18:30

## 2019-08-20 RX ADMIN — OXCARBAZEPINE 150 MILLIGRAM(S): 300 TABLET, FILM COATED ORAL at 18:29

## 2019-08-20 RX ADMIN — Medication 81 MILLIGRAM(S): at 12:41

## 2019-08-20 RX ADMIN — SODIUM CHLORIDE 3 MILLILITER(S): 9 INJECTION INTRAMUSCULAR; INTRAVENOUS; SUBCUTANEOUS at 15:32

## 2019-08-20 RX ADMIN — CLOPIDOGREL BISULFATE 75 MILLIGRAM(S): 75 TABLET, FILM COATED ORAL at 12:41

## 2019-08-20 RX ADMIN — SODIUM CHLORIDE 3 MILLILITER(S): 9 INJECTION INTRAMUSCULAR; INTRAVENOUS; SUBCUTANEOUS at 05:39

## 2019-08-20 RX ADMIN — FAMOTIDINE 40 MILLIGRAM(S): 10 INJECTION INTRAVENOUS at 12:42

## 2019-08-20 RX ADMIN — LOSARTAN POTASSIUM 25 MILLIGRAM(S): 100 TABLET, FILM COATED ORAL at 05:38

## 2019-08-20 RX ADMIN — BUPROPION HYDROCHLORIDE 300 MILLIGRAM(S): 150 TABLET, EXTENDED RELEASE ORAL at 12:41

## 2019-08-20 RX ADMIN — NYSTATIN CREAM 1 APPLICATION(S): 100000 CREAM TOPICAL at 05:39

## 2019-08-20 RX ADMIN — ATORVASTATIN CALCIUM 80 MILLIGRAM(S): 80 TABLET, FILM COATED ORAL at 21:33

## 2019-08-20 RX ADMIN — SENNA PLUS 1 TABLET(S): 8.6 TABLET ORAL at 21:33

## 2019-08-20 RX ADMIN — OXCARBAZEPINE 150 MILLIGRAM(S): 300 TABLET, FILM COATED ORAL at 05:38

## 2019-08-20 RX ADMIN — HEPARIN SODIUM 5000 UNIT(S): 5000 INJECTION INTRAVENOUS; SUBCUTANEOUS at 05:38

## 2019-08-20 RX ADMIN — ARIPIPRAZOLE 5 MILLIGRAM(S): 15 TABLET ORAL at 12:41

## 2019-08-20 RX ADMIN — TAMSULOSIN HYDROCHLORIDE 0.4 MILLIGRAM(S): 0.4 CAPSULE ORAL at 21:33

## 2019-08-20 RX ADMIN — Medication 100 MILLIGRAM(S): at 05:38

## 2019-08-20 RX ADMIN — HEPARIN SODIUM 1700 UNIT(S)/HR: 5000 INJECTION INTRAVENOUS; SUBCUTANEOUS at 22:19

## 2019-08-20 RX ADMIN — SODIUM CHLORIDE 3 MILLILITER(S): 9 INJECTION INTRAMUSCULAR; INTRAVENOUS; SUBCUTANEOUS at 21:34

## 2019-08-20 RX ADMIN — DULOXETINE HYDROCHLORIDE 60 MILLIGRAM(S): 30 CAPSULE, DELAYED RELEASE ORAL at 18:29

## 2019-08-20 RX ADMIN — Medication 1 TABLET(S): at 12:41

## 2019-08-20 RX ADMIN — FINASTERIDE 5 MILLIGRAM(S): 5 TABLET, FILM COATED ORAL at 12:41

## 2019-08-20 RX ADMIN — Medication 500 MILLIGRAM(S): at 12:41

## 2019-08-20 RX ADMIN — HEPARIN SODIUM 5000 UNIT(S): 5000 INJECTION INTRAVENOUS; SUBCUTANEOUS at 18:29

## 2019-08-20 RX ADMIN — Medication 2 MILLIGRAM(S): at 21:33

## 2019-08-20 NOTE — PROGRESS NOTE ADULT - PROBLEM SELECTOR PLAN 2
- Stable.  - Continue Statin, ASA, Plavix, Toprol XL  - [facility] was reason for transfer, however, the stress test suggests mostly fixed defects so intervention unlikely to yield much benefit. Discuss with team on Monday. - Stable.  - Continue Statin, ASA, Plavix, toprol  - [facility] was reason for transfer, however, the stress test suggests mostly fixed defects so intervention unlikely to yield much benefit. Discuss with team on Monday. - Stable.  - Continue Statin, ASA, Plavix, toprol  - [facility] was reason for transfer, however, the stress test suggests mostly fixed defects so intervention unlikely to yield much benefit.

## 2019-08-20 NOTE — PROGRESS NOTE ADULT - SUBJECTIVE AND OBJECTIVE BOX
Patient is a 63y old  Male who presents with a chief complaint of unknown (19 Aug 2019 19:28)      SUBJECTIVE / OVERNIGHT EVENTS:  There were no acute events overnight.    MEDICATIONS  (STANDING):  ARIPiprazole 5 milliGRAM(s) Oral daily  ascorbic acid 500 milliGRAM(s) Oral daily  aspirin enteric coated 81 milliGRAM(s) Oral daily  atorvastatin 80 milliGRAM(s) Oral at bedtime  buPROPion XL . 300 milliGRAM(s) Oral daily  clonazePAM  Tablet 2 milliGRAM(s) Oral at bedtime  clopidogrel Tablet 75 milliGRAM(s) Oral daily  docusate sodium 100 milliGRAM(s) Oral daily  DULoxetine 60 milliGRAM(s) Oral daily  famotidine    Tablet 40 milliGRAM(s) Oral daily  finasteride 5 milliGRAM(s) Oral daily  furosemide    Tablet 40 milliGRAM(s) Oral daily  heparin  Injectable 5000 Unit(s) SubCutaneous every 12 hours  losartan 25 milliGRAM(s) Oral daily  metoprolol succinate  milliGRAM(s) Oral daily  multivitamin/minerals 1 Tablet(s) Oral daily  nystatin Cream 1 Application(s) Topical two times a day  OXcarbazepine 150 milliGRAM(s) Oral two times a day  senna 1 Tablet(s) Oral at bedtime  sodium chloride 0.9% lock flush 3 milliLiter(s) IV Push every 8 hours  tamsulosin 0.4 milliGRAM(s) Oral at bedtime    MEDICATIONS  (PRN):  acetaminophen   Tablet .. 650 milliGRAM(s) Oral every 6 hours PRN Mild Pain (1 - 3)      Vital Signs Last 24 Hrs  T(C): 36.5 (20 Aug 2019 08:44), Max: 36.9 (19 Aug 2019 20:19)  T(F): 97.7 (20 Aug 2019 08:44), Max: 98.4 (19 Aug 2019 20:19)  HR: 83 (20 Aug 2019 08:44) (67 - 83)  BP: 103/67 (20 Aug 2019 08:44) (101/64 - 110/61)  BP(mean): --  RR: 19 (20 Aug 2019 08:44) (18 - 19)  SpO2: 94% (20 Aug 2019 08:44) (94% - 99%)  CAPILLARY BLOOD GLUCOSE        I&O's Summary    19 Aug 2019 07:01  -  20 Aug 2019 07:00  --------------------------------------------------------  IN: 600 mL / OUT: 1500 mL / NET: -900 mL        PHYSICAL EXAM:  GENERAL: NAD, well-developed  HEAD:  Atraumatic, Normocephalic  EYES: EOMI, PERRLA, conjunctiva and sclera clear  NECK: Supple, No JVD  CHEST/LUNG: Clear to auscultation bilaterally; No wheeze  HEART: Regular rate and rhythm; No murmurs, rubs, or gallops  ABDOMEN: Soft, Nontender, Nondistended; Bowel sounds present  EXTREMITIES:  2+ Peripheral Pulses, No clubbing, cyanosis, or edema  PSYCH: AAOx3  NEUROLOGY: non-focal  SKIN: No rashes or lesions    LABS:    08-19    139  |  103  |  24<H>  ----------------------------<  94  4.2   |  26  |  1.24    Ca    9.6      19 Aug 2019 06:33  Mg     2.0     08-19                RADIOLOGY & ADDITIONAL TESTS:    Imaging Personally Reviewed:    Consultant(s) Notes Reviewed:      Care Discussed with Consultants/Other Providers:

## 2019-08-20 NOTE — PROGRESS NOTE ADULT - PROBLEM SELECTOR PLAN 1
- Stable and well compensated.  - continue lasix 40 daily  - will discontinue losartan and transition to Entresto, has been off Lisinopril for 72 hrs (last dose 8/17 6 am).  - TTE requested here as the last one was in 2014. - Stable and well compensated.  - continue lasix 40 daily  - will discontinue losartan and transition to Entresto, has been off Lisinopril for 72 hrs (last dose 8/17 6 am).  - TTE pending - Stable and well compensated.  - reduce lasix from 40 mg daily to 20 mg daily  - discontinue losartan and begin entresto 24-26 in the morning  - TTE pending - Stable and well compensated.  - reduce lasix from 40 mg daily to 20 mg daily  - discontinue losartan and begin entresto 24-26 mg BID in the morning  - TTE pending

## 2019-08-20 NOTE — PROGRESS NOTE ADULT - SUBJECTIVE AND OBJECTIVE BOX
No pain, no shortness of breath      VITAL:  T(C): , Max: 36.9 (08-19-19 @ 20:19)  T(F): , Max: 98.4 (08-19-19 @ 20:19)  HR: 83 (08-20-19 @ 08:44)  BP: 103/67 (08-20-19 @ 08:44)  RR: 19 (08-20-19 @ 08:44)  SpO2: 94% (08-20-19 @ 08:44)      PHYSICAL EXAM:  Constitutional: NAD, Alert  HEENT: NCAT, DMM  Neck: Supple, No JVD  Respiratory: CTA-b/l  Cardiovascular: RRR s1s2, no m/r/g  Gastrointestinal: BS+, soft, NT/ND  Extremities: trace b/l LE edema  Neurological: (+)coarse RUE tremor; contracted right hand  Back: no CVAT b/l  Skin: (+)fibrotic changes b/l LE      LABS:    Na(139)/K(4.2)/Cl(103)/HCO3(26)/BUN(24)/Cr(1.24)Glu(94)/Ca(9.6)/Mg(2.0)/PO4(--)    08-19 @ 06:33  Na(139)/K(3.8)/Cl(104)/HCO3(25)/BUN(23)/Cr(1.40)Glu(111)/Ca(8.9)/Mg(--)/PO4(--)    08-18 @ 06:08        IMPRESSION: 63M w/ HTN, DM2, CAD-CABG, CVA, and HFrEF (10%), 8/16/19 from OSH s/p decompensated CHF and potentially for cardiac cath  (1)Renal - mild azotemia as of yesterday - prerenal - improved as of today  (2)Cardiac - Resolving/resolved CHF flare - no plan for cath at this point - CHF team on board    RECOMMEND:  (1)Continue Low-dose Cozaar as ordered  (2)No objection to Lasix - 40mg po qd                Eriberto Fowler MD  [facility]  [phone]

## 2019-08-20 NOTE — PROGRESS NOTE ADULT - PROBLEM SELECTOR PLAN 3
- Well controlled on prior regimen, but Norvasc stopped to allow BP room for GDMT for his HF. - Well controlled on prior regimen, but Norvasc stopped in order to initiate neurohormonal antagonists.

## 2019-08-20 NOTE — CONSULT NOTE ADULT - SUBJECTIVE AND OBJECTIVE BOX
Corrie Salinas MD  Cardiology Fellow  All Cardiology service information can be found 24/7 on [email or web address], password: [email or web address]    Patient seen and evaluated at bedside.    Global impression:  62 yo M with ICM, LVEF 10-15%, and severe LV enlargement on recent nuclear stress test who was admitted for cardiac cath. The HF service was consulted to provide medical optimization prior to considering an intervention. He has significant functional limitation due to prior stroke and residual weakness, but he can get around the home using a walker with everything on a single level. He has had a chronic indwelling Pro catheter for a few months according to his sister for "an infection." He has sacral decubiti. He has no overt volume overload.    Cardiology called for new findings of small apical/mural thrombus on echo performed today.    Prior Cardiac Testing:  Last stress test on 8/5/2019, last seen by Dr. Padron was 7/11/2019.    * Baseline ECG: There were up to 1 mm downsloping ST segment depressions and T wave inversions in leads II, III, aVF, and V4-V6 at baseline.  * ECG Changes: Baseline ECG abnormalities persisted during rest, stress, and recovery. * Arrhythmia: Occasional VPDs occurred during rest, stress and recovery.  * The left ventricle was enlarged. There are large, moderate to severe defects in the anterior, anterolateral, basal and distal anteroseptal, and apical walls that are mostly fixed suggestive of infarct with mild paulo-infarct ischemia.  * There are large, severe defects in the inferior, inferolateral, and basal and distal inferoseptal walls that are mostly fixed suggestive of infarct with mild paulo-infarct ischemia. * Post-stress gated wall motion analysis was performed (LVEF = 14 %;LVEDV = 346 ml.) Severe global LV systolic dysfunction.   < end of copied text >    Echo on 8/8/2019: mod dilated LV size, normal RV systolic function, mod dilated left atrium EF 10%.     REVIEW OF SYSTEMS:  CONSTITUTIONAL: No weakness, fevers or chills  EYES/ENT: No visual changes;  No dysphagia  NECK: No pain or stiffness  RESPIRATORY: No cough, wheezing, hemoptysis; No shortness of breath  CARDIOVASCULAR: No chest pain or palpitations; No lower extremity edema  GASTROINTESTINAL: No abdominal or epigastric pain. No nausea, vomiting, or hematemesis; No diarrhea or constipation. No melena or hematochezia.  BACK: No back pain  GENITOURINARY: No dysuria, frequency or hematuria  NEUROLOGICAL: No numbness or weakness  SKIN: No itching, burning, rashes, or lesions   All other review of systems is negative unless indicated above.    Physical Exam:  T(F): 98.3 (08-20), Max: 99.1 (08-20)  HR: 75 (08-20) (67 - 83)  BP: 115/70 (08-20) (101/64 - 115/70)  RR: 18 (08-20)  SpO2: 98% (08-20)  GENERAL: No acute distress, well-developed  HEAD:  Atraumatic, Normocephalic  ENT: EOMI, PERRLA, conjunctiva and sclera clear, Neck supple, No JVD, moist mucosa  CHEST/LUNG: Clear to auscultation bilaterally; No wheeze, equal breath sounds bilaterally   BACK: No spinal tenderness  HEART: Regular rate and rhythm; No murmurs, rubs, or gallops  ABDOMEN: Soft, Nontender, Nondistended; Bowel sounds present  EXTREMITIES:  No clubbing, cyanosis, or edema  PSYCH: Nl behavior, nl affect  NEUROLOGY: AAOx3, non-focal, cranial nerves intact  SKIN: Normal color, No rashes or lesions    Labs: Personally reviewed    08-19    139  |  103  |  24<H>  ----------------------------<  94  4.2   |  26  |  1.24    Ca    9.6      19 Aug 2019 06:33  Mg     2.0     08-19    PTT - ( 20 Aug 2019 21:48 )  PTT:32.1 sec    Serum Pro-Brain Natriuretic Peptide: 2249 pg/mL (08-17 @ 07:11)    Hemoglobin A1C, Whole Blood: 5.3 % (08-18 @ 08:26)  Hemoglobin A1C, Whole Blood: 5.2 % (08-17 @ 10:29)

## 2019-08-20 NOTE — CHART NOTE - NSCHARTNOTEFT_GEN_A_CORE
Patient with + TTE/Wdoppler results, Notified by Dr. Fulton @ 7:15PM   : There appears to be the suggestion of a small  left ventricular apical, mural thrombus. Consider CT or MRI  to confirm finding    D/W Attending Dr. Moran, who instructed to contact cardiology in regards to Anticoagulation plan. Patient initially evaluated by Dr. Diaz, and being followed by Dr. Padron. D/W Cardiology fellow overnight Dr. Denton, will f/u her recommendations.     Virgil Ashton PA-C   Department of Medicine Patient with + TTE/Wdoppler results, Notified by Dr. Fulton @ 7:15PM   : There appears to be the suggestion of a small  left ventricular apical, mural thrombus. Consider CT or MRI  to confirm finding    D/W Attending Dr. Moran, who instructed to contact cardiology in regards to Anticoagulation plan. Patient initially evaluated by Dr. Diaz, and being followed by Dr. Padron. D/W Cardiology fellow overnight Dr. Denton, awaiting her recommendations.     Virgil Ashton PA-C   Department of Medicine Patient with + TTE/Wdoppler results, Notified by Dr. Fulton @ 7:15PM   : There appears to be the suggestion of a small  left ventricular apical, mural thrombus. Consider CT or MRI  to confirm finding    D/W Attending Dr. Moran, who instructed to contact cardiology in regards to Anticoagulation plan. Patient initially evaluated by Dr. Diaz, and being followed by Dr. Padron. D/W Cardiology fellow overnight Dr. Dneton, awaiting her recommendations. -->recommend hep gtt with likely bridge to coumadin, patient seen and evaluated, no overt signs of bleeding, VSS, will initiate heparin gtt.    Virgil Ashton PA-C   Department of Medicine

## 2019-08-20 NOTE — CONSULT NOTE ADULT - ASSESSMENT
64 yo M with ICM, LVEF 10-15%, and severe LV enlargement on recent nuclear stress test who was admitted for cardiac cath. The HF service was consulted to provide medical optimization prior to considering an intervention. He has no overt volume overload. Cardiology called for new findings of small apical/mural thrombus on echo performed today.    -Recommendation is to start heparin gtt tonight with intention to bridge to Coumadin in the AM.  -Will see patient in the AM again.    Corrie Salinas MD PGY-4  Cardiology Fellow
63 year old  male no implantable device with PMHx: HTN, DMT2 (not on meds, last A1C in 2016 was 5.6), CAD, MI followed by CABG X 4 (2007), Stent x 2 in 2011, CVA with right side weakness and tremors, HTN, HLD, stage III- IV sacral decubitus presented to [facility] on 8/8/2019 c/o SOB, no chest pain, no dizziness or syncopal episode. CXR showed pulm congestion with prBNP 10k, WBC 18, was placed on BIPAP admitted to CCU for CHF exacerbation diuresed, and now on 3L/NC. Had mild elevation of Troponin 0.38 from 0.36. On admission PE, pt had bilateral crackles with JVD as per record. For possible pnemonia & UTI (positive for 3 organism), a course of ABX (Rocephin and Zithromax) completed on 8/15. EF 10% on this admission compare to last EF 14% by stress test on 8/5/2019 at [facility].
Assessment and Plan:    Acute on Chronic Systolic Heart Failure likely from ICM  - reports significant diuresis out outside hospital, went from Bipap to 2L NC  - please give 20 mg of IV lasix  - continue metop 50 bid  - starting tomorrow recommend switching to losartan 25 mg qday   - need to get labs BMP, BNP, Mg  - stop amlodipine    CAD  - continue ASA/Plavix/Statin    Kahlil Pratt MD  Cardiology Fellow   [phone]  For all other Cardiology service contact information, go to [email or web address] and use "[email or web address]" to login.

## 2019-08-20 NOTE — PROGRESS NOTE ADULT - SUBJECTIVE AND OBJECTIVE BOX
INTERVAL HPI/OVERNIGHT EVENTS: Seen and examined with sister in room . Sitting in chair . I feel fine.   Vital Signs Last 24 Hrs  T(C): 36.7 (20 Aug 2019 11:53), Max: 36.9 (19 Aug 2019 20:19)  T(F): 98.1 (20 Aug 2019 11:53), Max: 98.4 (19 Aug 2019 20:19)  HR: 79 (20 Aug 2019 11:53) (67 - 83)  BP: 111/73 (20 Aug 2019 11:53) (101/64 - 111/73)  BP(mean): --  RR: 18 (20 Aug 2019 11:53) (18 - 19)  SpO2: 96% (20 Aug 2019 11:53) (94% - 99%)  I&O's Summary    19 Aug 2019 07:01  -  20 Aug 2019 07:00  --------------------------------------------------------  IN: 600 mL / OUT: 1500 mL / NET: -900 mL    20 Aug 2019 07:01  -  20 Aug 2019 13:59  --------------------------------------------------------  IN: 760 mL / OUT: 0 mL / NET: 760 mL      MEDICATIONS  (STANDING):  ARIPiprazole 5 milliGRAM(s) Oral daily  ascorbic acid 500 milliGRAM(s) Oral daily  aspirin enteric coated 81 milliGRAM(s) Oral daily  atorvastatin 80 milliGRAM(s) Oral at bedtime  buPROPion XL . 300 milliGRAM(s) Oral daily  clonazePAM  Tablet 2 milliGRAM(s) Oral at bedtime  clopidogrel Tablet 75 milliGRAM(s) Oral daily  docusate sodium 100 milliGRAM(s) Oral daily  DULoxetine 60 milliGRAM(s) Oral daily  famotidine    Tablet 40 milliGRAM(s) Oral daily  finasteride 5 milliGRAM(s) Oral daily  furosemide    Tablet 40 milliGRAM(s) Oral daily  heparin  Injectable 5000 Unit(s) SubCutaneous every 12 hours  losartan 25 milliGRAM(s) Oral daily  metoprolol succinate  milliGRAM(s) Oral daily  multivitamin/minerals 1 Tablet(s) Oral daily  nystatin Cream 1 Application(s) Topical two times a day  OXcarbazepine 150 milliGRAM(s) Oral two times a day  senna 1 Tablet(s) Oral at bedtime  sodium chloride 0.9% lock flush 3 milliLiter(s) IV Push every 8 hours  tamsulosin 0.4 milliGRAM(s) Oral at bedtime    MEDICATIONS  (PRN):  acetaminophen   Tablet .. 650 milliGRAM(s) Oral every 6 hours PRN Mild Pain (1 - 3)    LABS:    08-19    139  |  103  |  24<H>  ----------------------------<  94  4.2   |  26  |  1.24    Ca    9.6      19 Aug 2019 06:33  Mg     2.0     08-19          CAPILLARY BLOOD GLUCOSE              REVIEW OF SYSTEMS:  CONSTITUTIONAL: No fever, weight loss, or fatigue  EYES: No eye pain, visual disturbances, or discharge  ENMT:  No difficulty hearing, tinnitus, vertigo; No sinus or throat pain  RESPIRATORY: No cough, wheezing, chills or hemoptysis; No shortness of breath  CARDIOVASCULAR: No chest pain, palpitations, dizziness, or leg swelling  GASTROINTESTINAL: No abdominal or epigastric pain. No nausea, vomiting, or hematemesis; No diarrhea or constipation. No melena or hematochezia.    Consultant(s) Notes Reviewed:  [x ] YES  [ ] NO    PHYSICAL EXAM:  GENERAL: NAD, well-groomed, well-developed ,not in any distress ,  HEAD:  Atraumatic, Normocephalic  NECK: Supple, No JVD, Normal thyroid  NERVOUS SYSTEM:  Alert & Oriented X3, No focal deficit   CHEST/LUNG: Good air entry bilateral with no  rales, rhonchi, wheezing, or rubs  HEART: Regular rate and rhythm; No murmurs, rubs, or gallops  ABDOMEN: Soft, Nontender, Nondistended; Bowel sounds present  EXTREMITIES: No  edema    Care Discussed with Consultants/Other Providers [ x] YES  [ ] NO

## 2019-08-21 DIAGNOSIS — I51.3 INTRACARDIAC THROMBOSIS, NOT ELSEWHERE CLASSIFIED: ICD-10-CM

## 2019-08-21 LAB
ANION GAP SERPL CALC-SCNC: 8 MMOL/L — SIGNIFICANT CHANGE UP (ref 5–17)
APTT BLD: 93.3 SEC — HIGH (ref 27.5–36.3)
APTT BLD: 98.7 SEC — HIGH (ref 27.5–36.3)
BUN SERPL-MCNC: 23 MG/DL — SIGNIFICANT CHANGE UP (ref 7–23)
CALCIUM SERPL-MCNC: 9 MG/DL — SIGNIFICANT CHANGE UP (ref 8.4–10.5)
CHLORIDE SERPL-SCNC: 104 MMOL/L — SIGNIFICANT CHANGE UP (ref 96–108)
CO2 SERPL-SCNC: 27 MMOL/L — SIGNIFICANT CHANGE UP (ref 22–31)
CREAT SERPL-MCNC: 1.32 MG/DL — HIGH (ref 0.5–1.3)
GLUCOSE SERPL-MCNC: 88 MG/DL — SIGNIFICANT CHANGE UP (ref 70–99)
HCT VFR BLD CALC: 38 % — LOW (ref 39–50)
HGB BLD-MCNC: 11.6 G/DL — LOW (ref 13–17)
MCHC RBC-ENTMCNC: 27.1 PG — SIGNIFICANT CHANGE UP (ref 27–34)
MCHC RBC-ENTMCNC: 30.6 GM/DL — LOW (ref 32–36)
MCV RBC AUTO: 88.5 FL — SIGNIFICANT CHANGE UP (ref 80–100)
PLATELET # BLD AUTO: 204 K/UL — SIGNIFICANT CHANGE UP (ref 150–400)
POTASSIUM SERPL-MCNC: 4.2 MMOL/L — SIGNIFICANT CHANGE UP (ref 3.5–5.3)
POTASSIUM SERPL-SCNC: 4.2 MMOL/L — SIGNIFICANT CHANGE UP (ref 3.5–5.3)
RBC # BLD: 4.29 M/UL — SIGNIFICANT CHANGE UP (ref 4.2–5.8)
RBC # FLD: 17.1 % — HIGH (ref 10.3–14.5)
SODIUM SERPL-SCNC: 139 MMOL/L — SIGNIFICANT CHANGE UP (ref 135–145)
WBC # BLD: 5.5 K/UL — SIGNIFICANT CHANGE UP (ref 3.8–10.5)
WBC # FLD AUTO: 5.5 K/UL — SIGNIFICANT CHANGE UP (ref 3.8–10.5)

## 2019-08-21 PROCEDURE — 99233 SBSQ HOSP IP/OBS HIGH 50: CPT | Mod: GC

## 2019-08-21 RX ORDER — SACUBITRIL AND VALSARTAN 24; 26 MG/1; MG/1
1 TABLET, FILM COATED ORAL
Refills: 0 | Status: DISCONTINUED | OUTPATIENT
Start: 2019-08-22 | End: 2019-08-26

## 2019-08-21 RX ORDER — FUROSEMIDE 40 MG
20 TABLET ORAL DAILY
Refills: 0 | Status: DISCONTINUED | OUTPATIENT
Start: 2019-08-21 | End: 2019-08-26

## 2019-08-21 RX ORDER — SACUBITRIL AND VALSARTAN 24; 26 MG/1; MG/1
1 TABLET, FILM COATED ORAL
Refills: 0 | Status: DISCONTINUED | OUTPATIENT
Start: 2019-08-21 | End: 2019-08-21

## 2019-08-21 RX ADMIN — SODIUM CHLORIDE 3 MILLILITER(S): 9 INJECTION INTRAMUSCULAR; INTRAVENOUS; SUBCUTANEOUS at 21:16

## 2019-08-21 RX ADMIN — OXCARBAZEPINE 150 MILLIGRAM(S): 300 TABLET, FILM COATED ORAL at 17:14

## 2019-08-21 RX ADMIN — OXCARBAZEPINE 150 MILLIGRAM(S): 300 TABLET, FILM COATED ORAL at 05:17

## 2019-08-21 RX ADMIN — Medication 40 MILLIGRAM(S): at 05:17

## 2019-08-21 RX ADMIN — Medication 2 MILLIGRAM(S): at 21:15

## 2019-08-21 RX ADMIN — Medication 500 MILLIGRAM(S): at 12:39

## 2019-08-21 RX ADMIN — HEPARIN SODIUM 1700 UNIT(S)/HR: 5000 INJECTION INTRAVENOUS; SUBCUTANEOUS at 13:20

## 2019-08-21 RX ADMIN — BUPROPION HYDROCHLORIDE 300 MILLIGRAM(S): 150 TABLET, EXTENDED RELEASE ORAL at 12:39

## 2019-08-21 RX ADMIN — DULOXETINE HYDROCHLORIDE 60 MILLIGRAM(S): 30 CAPSULE, DELAYED RELEASE ORAL at 12:39

## 2019-08-21 RX ADMIN — ATORVASTATIN CALCIUM 80 MILLIGRAM(S): 80 TABLET, FILM COATED ORAL at 21:15

## 2019-08-21 RX ADMIN — HEPARIN SODIUM 1700 UNIT(S)/HR: 5000 INJECTION INTRAVENOUS; SUBCUTANEOUS at 06:22

## 2019-08-21 RX ADMIN — Medication 1 TABLET(S): at 12:39

## 2019-08-21 RX ADMIN — FAMOTIDINE 40 MILLIGRAM(S): 10 INJECTION INTRAVENOUS at 12:39

## 2019-08-21 RX ADMIN — Medication 100 MILLIGRAM(S): at 05:17

## 2019-08-21 RX ADMIN — NYSTATIN CREAM 1 APPLICATION(S): 100000 CREAM TOPICAL at 18:01

## 2019-08-21 RX ADMIN — SODIUM CHLORIDE 3 MILLILITER(S): 9 INJECTION INTRAMUSCULAR; INTRAVENOUS; SUBCUTANEOUS at 12:38

## 2019-08-21 RX ADMIN — FINASTERIDE 5 MILLIGRAM(S): 5 TABLET, FILM COATED ORAL at 12:39

## 2019-08-21 RX ADMIN — LOSARTAN POTASSIUM 25 MILLIGRAM(S): 100 TABLET, FILM COATED ORAL at 05:17

## 2019-08-21 RX ADMIN — SENNA PLUS 1 TABLET(S): 8.6 TABLET ORAL at 21:16

## 2019-08-21 RX ADMIN — Medication 100 MILLIGRAM(S): at 12:39

## 2019-08-21 RX ADMIN — NYSTATIN CREAM 1 APPLICATION(S): 100000 CREAM TOPICAL at 05:17

## 2019-08-21 RX ADMIN — CLOPIDOGREL BISULFATE 75 MILLIGRAM(S): 75 TABLET, FILM COATED ORAL at 12:39

## 2019-08-21 RX ADMIN — ARIPIPRAZOLE 5 MILLIGRAM(S): 15 TABLET ORAL at 12:39

## 2019-08-21 RX ADMIN — TAMSULOSIN HYDROCHLORIDE 0.4 MILLIGRAM(S): 0.4 CAPSULE ORAL at 21:15

## 2019-08-21 RX ADMIN — Medication 81 MILLIGRAM(S): at 12:39

## 2019-08-21 RX ADMIN — SODIUM CHLORIDE 3 MILLILITER(S): 9 INJECTION INTRAMUSCULAR; INTRAVENOUS; SUBCUTANEOUS at 05:14

## 2019-08-21 NOTE — PROGRESS NOTE ADULT - PROBLEM SELECTOR PLAN 1
- Stable and well compensated.  - continue lasix 20 mg daily  - continue entresto 24-26 mg BID in the morning  - TTE with EF 20%, mild MR, eccentric LVH, severe global LV systolic dysfunction - Stable and well compensated.  - patient is medically optimized and will undergo cardiac cath tomorrow  - start lasix 20 mg daily in the morning  - start entresto 24-26 mg BID in the morning  - TTE with EF 20%, mild MR, eccentric LVH, severe global LV systolic dysfunction - Stable and well compensated.  - TTE with EF 20%, mild MR, eccentric LVH, severe global LV systolic dysfunction  - patient is medically optimized and will undergo cardiac cath tomorrow  - start lasix 20 mg daily in the morning  - start entresto 24-26 mg BID in the morning

## 2019-08-21 NOTE — PROGRESS NOTE ADULT - SUBJECTIVE AND OBJECTIVE BOX
Patient is a 63y old  Male who presents with a chief complaint of SOB (20 Aug 2019 13:59)      SUBJECTIVE / OVERNIGHT EVENTS:  Overnight, on telemetry, the patient was in NSR with a HR  of 60-70 bpm.  Overnight, Cardiology was consulted because an apical, mural thrombus was found on the echo performed yesterday.  A heparin gtt was started with the intention to bridge to coumadin.  The patient has no complaints of chest pain or shortness of breath.    MEDICATIONS  (STANDING):  ARIPiprazole 5 milliGRAM(s) Oral daily  ascorbic acid 500 milliGRAM(s) Oral daily  aspirin enteric coated 81 milliGRAM(s) Oral daily  atorvastatin 80 milliGRAM(s) Oral at bedtime  buPROPion XL . 300 milliGRAM(s) Oral daily  clonazePAM  Tablet 2 milliGRAM(s) Oral at bedtime  clopidogrel Tablet 75 milliGRAM(s) Oral daily  docusate sodium 100 milliGRAM(s) Oral daily  DULoxetine 60 milliGRAM(s) Oral daily  famotidine    Tablet 40 milliGRAM(s) Oral daily  finasteride 5 milliGRAM(s) Oral daily  furosemide    Tablet 20 milliGRAM(s) Oral daily  heparin  Infusion.  Unit(s)/Hr (17 mL/Hr) IV Continuous <Continuous>  metoprolol succinate  milliGRAM(s) Oral daily  multivitamin/minerals 1 Tablet(s) Oral daily  nystatin Cream 1 Application(s) Topical two times a day  OXcarbazepine 150 milliGRAM(s) Oral two times a day  sacubitril 24 mG/valsartan 26 mG 1 Tablet(s) Oral two times a day  senna 1 Tablet(s) Oral at bedtime  sodium chloride 0.9% lock flush 3 milliLiter(s) IV Push every 8 hours  tamsulosin 0.4 milliGRAM(s) Oral at bedtime    MEDICATIONS  (PRN):  acetaminophen   Tablet .. 650 milliGRAM(s) Oral every 6 hours PRN Mild Pain (1 - 3)  heparin  Injectable 7500 Unit(s) IV Push every 6 hours PRN For aPTT less than 40  heparin  Injectable 3500 Unit(s) IV Push every 6 hours PRN For aPTT between 40 - 57      Vital Signs Last 24 Hrs  T(C): 36.6 (21 Aug 2019 08:25), Max: 37.3 (20 Aug 2019 17:01)  T(F): 97.9 (21 Aug 2019 08:25), Max: 99.1 (20 Aug 2019 17:01)  HR: 73 (21 Aug 2019 08:25) (67 - 79)  BP: 94/56 (21 Aug 2019 08:25) (94/56 - 115/70)  BP(mean): --  RR: 18 (21 Aug 2019 08:25) (18 - 18)  SpO2: 98% (21 Aug 2019 08:25) (95% - 98%)  CAPILLARY BLOOD GLUCOSE        I&O's Summary    20 Aug 2019 07:01  -  21 Aug 2019 07:00  --------------------------------------------------------  IN: 1270 mL / OUT: 1900 mL / NET: -630 mL        PHYSICAL EXAM:  GENERAL: NAD  HEAD:  Atraumatic, Normocephalic  EYES: EOMI, PERRLA, conjunctiva and sclera clear  NECK: Supple, No JVD  CHEST/LUNG: Clear to auscultation bilaterally; No wheezes  HEART: Regular rate and rhythm; No murmurs, rubs, or gallops  ABDOMEN: Soft, Nontender, Nondistended; Bowel sounds present  EXTREMITIES:  2+ Peripheral Pulses, No clubbing, cyanosis, or edema, warm  PSYCH: AAOx3  NEUROLOGY: non-focal  SKIN: No rashes or lesions    LABS:                        11.6   5.5   )-----------( 204      ( 21 Aug 2019 05:07 )             38.0     08-21    139  |  104  |  23  ----------------------------<  88  4.2   |  27  |  1.32<H>    Ca    9.0      21 Aug 2019 05:07      PTT - ( 21 Aug 2019 05:07 )  PTT:93.3 sec          RADIOLOGY & ADDITIONAL TESTS:    Imaging Personally Reviewed:    Consultant(s) Notes Reviewed:      Care Discussed with Consultants/Other Providers:

## 2019-08-21 NOTE — PROGRESS NOTE ADULT - SUBJECTIVE AND OBJECTIVE BOX
No pain, no shortness of breath      VITAL:  T(C): , Max: 37.3 (08-20-19 @ 17:01)  T(F): , Max: 99.1 (08-20-19 @ 17:01)  HR: 72 (08-21-19 @ 04:36)  BP: 103/65 (08-21-19 @ 04:36)  RR: 18 (08-21-19 @ 04:36)  SpO2: 98% (08-21-19 @ 04:36)        PHYSICAL EXAM:  Constitutional: NAD, Alert  HEENT: NCAT, DMM  Neck: Supple, No JVD  Respiratory: CTA-b/l  Cardiovascular: RRR s1s2, no m/r/g  Gastrointestinal: BS+, soft, NT/ND  Extremities: trace b/l LE edema  Neurological: (+)coarse RUE tremor; contracted right hand  Back: no CVAT b/l  Skin: (+)fibrotic changes b/l LE    LABS:                        11.6   5.5   )-----------( 204      ( 21 Aug 2019 05:07 )             38.0     Na(139)/K(4.2)/Cl(104)/HCO3(27)/BUN(23)/Cr(1.32)Glu(88)/Ca(9.0)/Mg(--)/PO4(--)    08-21 @ 05:07  Na(139)/K(4.2)/Cl(103)/HCO3(26)/BUN(24)/Cr(1.24)Glu(94)/Ca(9.6)/Mg(2.0)/PO4(--)    08-19 @ 06:33    IMAGING:  < from: TTE with Doppler (w/Cont) (08.20.19 @ 15:15) >  1. Eccentric left ventricular hypertrophy (dilated left  ventricle with normal relative wall thickness).  2. Endocardial visualization enhanced with intravenous  injection of Ultrasonic Enhancing Agent (Definity). Severe  global left ventricular systolic dysfunction with regional  variation. There appears to be the suggestion of a small  left ventricular apical, mural thrombus. Consider CT or MRI  to confirm finding. Notified JENNIFER Ashton at 7:00 PM  8/20/2019.  3.Normal right ventricular size and function.    < end of copied text >    IMPRESSION: 63M w/ HTN, DM2, CAD-CABG, CVA, and severe HFrEF, 8/16/19 from OSH s/p decompensated CHF and potentially for cardiac cath  (1)Renal - mild azotemia as of yesterday - prerenal - improved as of today  (2)Cardiac - ?LV thrombus on echo - now on heparin gtt      RECOMMEND:  (1)Antihypertensives/diuretics as ordered  (2)A/C per primary team  (3)If a contrast study is needed to confirm LV thrombus, would favor MRI with gado over CT with iodinated contrast, given contrast nephropathy risk            Eriberto Fowler MD  [facility]  [phone] No pain, no shortness of breath      VITAL:  T(C): , Max: 37.3 (08-20-19 @ 17:01)  T(F): , Max: 99.1 (08-20-19 @ 17:01)  HR: 72 (08-21-19 @ 04:36)  BP: 103/65 (08-21-19 @ 04:36)  RR: 18 (08-21-19 @ 04:36)  SpO2: 98% (08-21-19 @ 04:36)        PHYSICAL EXAM:  Constitutional: NAD, Alert  HEENT: NCAT, DMM  Neck: Supple, No JVD  Respiratory: CTA-b/l  Cardiovascular: RRR s1s2, no m/r/g  Gastrointestinal: BS+, soft, NT/ND  Extremities: trace b/l LE edema  Neurological: (+)coarse RUE tremor; contracted right hand  Back: no CVAT b/l  Skin: (+)fibrotic changes b/l LE    LABS:                        11.6   5.5   )-----------( 204      ( 21 Aug 2019 05:07 )             38.0     Na(139)/K(4.2)/Cl(104)/HCO3(27)/BUN(23)/Cr(1.32)Glu(88)/Ca(9.0)/Mg(--)/PO4(--)    08-21 @ 05:07  Na(139)/K(4.2)/Cl(103)/HCO3(26)/BUN(24)/Cr(1.24)Glu(94)/Ca(9.6)/Mg(2.0)/PO4(--)    08-19 @ 06:33    IMAGING:  < from: TTE with Doppler (w/Cont) (08.20.19 @ 15:15) >  1. Eccentric left ventricular hypertrophy (dilated left  ventricle with normal relative wall thickness).  2. Endocardial visualization enhanced with intravenous  injection of Ultrasonic Enhancing Agent (Definity). Severe  global left ventricular systolic dysfunction with regional  variation. There appears to be the suggestion of a small  left ventricular apical, mural thrombus. Consider CT or MRI  to confirm finding. Notified JENNIFER Ashton at 7:00 PM  8/20/2019.  3.Normal right ventricular size and function.      IMPRESSION: 63M w/ HTN, DM2, CAD-CABG, CVA, and severe HFrEF, 8/16/19 from OSH s/p decompensated CHF and potentially for cardiac cath  (1)Renal - mild azotemia as of yesterday - prerenal - improved as of today  (2)Cardiac - ?LV thrombus on echo - now on heparin gtt      RECOMMEND:  (1)Antihypertensives/diuretics as ordered  (2)A/C per primary team  (3)If a contrast study is needed to confirm LV thrombus, would favor MRI with gado over CT with iodinated contrast, given contrast nephropathy risk            Eriberto Fowler MD  [facility]  [phone]

## 2019-08-21 NOTE — PROGRESS NOTE ADULT - PROBLEM SELECTOR PLAN 2
TTE performed on 8/20 suggestive of apical, mural thrombus  - continue with heparin gtt and bridge to coumadin as per primary team TTE performed on 8/20 suggestive of apical, mural thrombus  - continue with heparin gtt; suggest bridging patient to a DOAC TTE performed on 8/20 suggestive of apical, mural thrombus  - continue with heparin gtt; suggest changing patient to a DOAC following angiogram.

## 2019-08-21 NOTE — PROGRESS NOTE ADULT - SUBJECTIVE AND OBJECTIVE BOX
INTERVAL HPI/OVERNIGHT EVENTS: I feel fine.   Vital Signs Last 24 Hrs  T(C): 36.6 (21 Aug 2019 08:25), Max: 37.3 (20 Aug 2019 17:01)  T(F): 97.9 (21 Aug 2019 08:25), Max: 99.1 (20 Aug 2019 17:01)  HR: 73 (21 Aug 2019 08:25) (72 - 79)  BP: 94/56 (21 Aug 2019 08:25) (94/56 - 115/70)  BP(mean): --  RR: 18 (21 Aug 2019 08:25) (18 - 18)  SpO2: 98% (21 Aug 2019 08:25) (96% - 98%)  I&O's Summary    20 Aug 2019 07:01  -  21 Aug 2019 07:00  --------------------------------------------------------  IN: 1270 mL / OUT: 1900 mL / NET: -630 mL    21 Aug 2019 07:01  -  21 Aug 2019 12:22  --------------------------------------------------------  IN: 290 mL / OUT: 0 mL / NET: 290 mL      MEDICATIONS  (STANDING):  ARIPiprazole 5 milliGRAM(s) Oral daily  ascorbic acid 500 milliGRAM(s) Oral daily  aspirin enteric coated 81 milliGRAM(s) Oral daily  atorvastatin 80 milliGRAM(s) Oral at bedtime  buPROPion XL . 300 milliGRAM(s) Oral daily  clonazePAM  Tablet 2 milliGRAM(s) Oral at bedtime  clopidogrel Tablet 75 milliGRAM(s) Oral daily  docusate sodium 100 milliGRAM(s) Oral daily  DULoxetine 60 milliGRAM(s) Oral daily  famotidine    Tablet 40 milliGRAM(s) Oral daily  finasteride 5 milliGRAM(s) Oral daily  furosemide    Tablet 20 milliGRAM(s) Oral daily  heparin  Infusion.  Unit(s)/Hr (17 mL/Hr) IV Continuous <Continuous>  metoprolol succinate  milliGRAM(s) Oral daily  multivitamin/minerals 1 Tablet(s) Oral daily  nystatin Cream 1 Application(s) Topical two times a day  OXcarbazepine 150 milliGRAM(s) Oral two times a day  sacubitril 24 mG/valsartan 26 mG 1 Tablet(s) Oral two times a day  senna 1 Tablet(s) Oral at bedtime  sodium chloride 0.9% lock flush 3 milliLiter(s) IV Push every 8 hours  tamsulosin 0.4 milliGRAM(s) Oral at bedtime    MEDICATIONS  (PRN):  acetaminophen   Tablet .. 650 milliGRAM(s) Oral every 6 hours PRN Mild Pain (1 - 3)  heparin  Injectable 7500 Unit(s) IV Push every 6 hours PRN For aPTT less than 40  heparin  Injectable 3500 Unit(s) IV Push every 6 hours PRN For aPTT between 40 - 57    LABS:                        11.6   5.5   )-----------( 204      ( 21 Aug 2019 05:07 )             38.0     08-21    139  |  104  |  23  ----------------------------<  88  4.2   |  27  |  1.32<H>    Ca    9.0      21 Aug 2019 05:07      PTT - ( 21 Aug 2019 05:07 )  PTT:93.3 sec    CAPILLARY BLOOD GLUCOSE              REVIEW OF SYSTEMS:  CONSTITUTIONAL: No fever, weight loss, or fatigue  EYES: No eye pain, visual disturbances, or discharge  ENMT:  No difficulty hearing, tinnitus, vertigo; No sinus or throat pain  RESPIRATORY: No cough, wheezing, chills or hemoptysis; No shortness of breath  CARDIOVASCULAR: No chest pain, palpitations, dizziness, or leg swelling  GASTROINTESTINAL: No abdominal or epigastric pain. No nausea, vomiting, or hematemesis; No diarrhea or constipation. No melena or hematochezia.  GENITOURINARY: No dysuria, frequency, hematuria, or incontinence    Consultant(s) Notes Reviewed:  [x ] YES  [ ] NO    PHYSICAL EXAM:  GENERAL: NAD, well-groomed, well-developed,not in any distress ,  HEAD:  Atraumatic, Normocephalic  NECK: Supple, No JVD, Normal thyroid  NERVOUS SYSTEM:  Alert & Oriented X3, No focal deficit   CHEST/LUNG: Good air entry bilateral with no  rales, rhonchi, wheezing, or rubs  HEART: Regular rate and rhythm; No murmurs, rubs, or gallops  ABDOMEN: Soft, Nontender, Nondistended; Bowel sounds present  EXTREMITIES:  2+ Peripheral Pulses, No clubbing, cyanosis, or edema  SKIN: decubitus +    Care Discussed with Consultants/Other Providers [ x] YES  [ ] NO

## 2019-08-22 LAB
ANION GAP SERPL CALC-SCNC: 10 MMOL/L — SIGNIFICANT CHANGE UP (ref 5–17)
APTT BLD: 89.5 SEC — HIGH (ref 27.5–36.3)
BUN SERPL-MCNC: 25 MG/DL — HIGH (ref 7–23)
CALCIUM SERPL-MCNC: 9.5 MG/DL — SIGNIFICANT CHANGE UP (ref 8.4–10.5)
CHLORIDE SERPL-SCNC: 101 MMOL/L — SIGNIFICANT CHANGE UP (ref 96–108)
CO2 SERPL-SCNC: 27 MMOL/L — SIGNIFICANT CHANGE UP (ref 22–31)
CREAT SERPL-MCNC: 1.41 MG/DL — HIGH (ref 0.5–1.3)
GLUCOSE SERPL-MCNC: 84 MG/DL — SIGNIFICANT CHANGE UP (ref 70–99)
HCT VFR BLD CALC: 36.3 % — LOW (ref 39–50)
HGB BLD-MCNC: 11.2 G/DL — LOW (ref 13–17)
INR BLD: 1.1 RATIO — SIGNIFICANT CHANGE UP (ref 0.88–1.16)
MCHC RBC-ENTMCNC: 27.1 PG — SIGNIFICANT CHANGE UP (ref 27–34)
MCHC RBC-ENTMCNC: 30.9 GM/DL — LOW (ref 32–36)
MCV RBC AUTO: 87.9 FL — SIGNIFICANT CHANGE UP (ref 80–100)
PLATELET # BLD AUTO: 223 K/UL — SIGNIFICANT CHANGE UP (ref 150–400)
POTASSIUM SERPL-MCNC: 4.5 MMOL/L — SIGNIFICANT CHANGE UP (ref 3.5–5.3)
POTASSIUM SERPL-SCNC: 4.5 MMOL/L — SIGNIFICANT CHANGE UP (ref 3.5–5.3)
PROTHROM AB SERPL-ACNC: 12.5 SEC — SIGNIFICANT CHANGE UP (ref 10–13.1)
RBC # BLD: 4.13 M/UL — LOW (ref 4.2–5.8)
RBC # FLD: 18 % — HIGH (ref 10.3–14.5)
SODIUM SERPL-SCNC: 138 MMOL/L — SIGNIFICANT CHANGE UP (ref 135–145)
WBC # BLD: 5.21 K/UL — SIGNIFICANT CHANGE UP (ref 3.8–10.5)
WBC # FLD AUTO: 5.21 K/UL — SIGNIFICANT CHANGE UP (ref 3.8–10.5)

## 2019-08-22 PROCEDURE — 99152 MOD SED SAME PHYS/QHP 5/>YRS: CPT | Mod: GC

## 2019-08-22 PROCEDURE — 99233 SBSQ HOSP IP/OBS HIGH 50: CPT | Mod: GC

## 2019-08-22 PROCEDURE — 93457 R HRT ART/GRFT ANGIO: CPT | Mod: 26,GC

## 2019-08-22 RX ORDER — APIXABAN 2.5 MG/1
5 TABLET, FILM COATED ORAL EVERY 12 HOURS
Refills: 0 | Status: DISCONTINUED | OUTPATIENT
Start: 2019-08-22 | End: 2019-08-26

## 2019-08-22 RX ORDER — SODIUM CHLORIDE 9 MG/ML
1000 INJECTION INTRAMUSCULAR; INTRAVENOUS; SUBCUTANEOUS
Refills: 0 | Status: COMPLETED | OUTPATIENT
Start: 2019-08-22 | End: 2019-08-22

## 2019-08-22 RX ADMIN — Medication 100 MILLIGRAM(S): at 05:36

## 2019-08-22 RX ADMIN — Medication 20 MILLIGRAM(S): at 05:39

## 2019-08-22 RX ADMIN — DULOXETINE HYDROCHLORIDE 60 MILLIGRAM(S): 30 CAPSULE, DELAYED RELEASE ORAL at 17:37

## 2019-08-22 RX ADMIN — ATORVASTATIN CALCIUM 80 MILLIGRAM(S): 80 TABLET, FILM COATED ORAL at 21:36

## 2019-08-22 RX ADMIN — NYSTATIN CREAM 1 APPLICATION(S): 100000 CREAM TOPICAL at 05:37

## 2019-08-22 RX ADMIN — APIXABAN 5 MILLIGRAM(S): 2.5 TABLET, FILM COATED ORAL at 19:05

## 2019-08-22 RX ADMIN — CLOPIDOGREL BISULFATE 75 MILLIGRAM(S): 75 TABLET, FILM COATED ORAL at 12:13

## 2019-08-22 RX ADMIN — SACUBITRIL AND VALSARTAN 1 TABLET(S): 24; 26 TABLET, FILM COATED ORAL at 17:37

## 2019-08-22 RX ADMIN — SODIUM CHLORIDE 250 MILLILITER(S): 9 INJECTION INTRAMUSCULAR; INTRAVENOUS; SUBCUTANEOUS at 12:13

## 2019-08-22 RX ADMIN — HEPARIN SODIUM 1700 UNIT(S)/HR: 5000 INJECTION INTRAVENOUS; SUBCUTANEOUS at 10:27

## 2019-08-22 RX ADMIN — Medication 100 MILLIGRAM(S): at 17:37

## 2019-08-22 RX ADMIN — NYSTATIN CREAM 1 APPLICATION(S): 100000 CREAM TOPICAL at 17:38

## 2019-08-22 RX ADMIN — ARIPIPRAZOLE 5 MILLIGRAM(S): 15 TABLET ORAL at 17:38

## 2019-08-22 RX ADMIN — Medication 1 TABLET(S): at 17:37

## 2019-08-22 RX ADMIN — SACUBITRIL AND VALSARTAN 1 TABLET(S): 24; 26 TABLET, FILM COATED ORAL at 05:36

## 2019-08-22 RX ADMIN — SENNA PLUS 1 TABLET(S): 8.6 TABLET ORAL at 21:36

## 2019-08-22 RX ADMIN — SODIUM CHLORIDE 3 MILLILITER(S): 9 INJECTION INTRAMUSCULAR; INTRAVENOUS; SUBCUTANEOUS at 21:33

## 2019-08-22 RX ADMIN — SODIUM CHLORIDE 3 MILLILITER(S): 9 INJECTION INTRAMUSCULAR; INTRAVENOUS; SUBCUTANEOUS at 05:36

## 2019-08-22 RX ADMIN — SODIUM CHLORIDE 3 MILLILITER(S): 9 INJECTION INTRAMUSCULAR; INTRAVENOUS; SUBCUTANEOUS at 12:18

## 2019-08-22 RX ADMIN — TAMSULOSIN HYDROCHLORIDE 0.4 MILLIGRAM(S): 0.4 CAPSULE ORAL at 21:36

## 2019-08-22 RX ADMIN — Medication 500 MILLIGRAM(S): at 17:37

## 2019-08-22 RX ADMIN — FAMOTIDINE 40 MILLIGRAM(S): 10 INJECTION INTRAVENOUS at 17:37

## 2019-08-22 RX ADMIN — FINASTERIDE 5 MILLIGRAM(S): 5 TABLET, FILM COATED ORAL at 17:37

## 2019-08-22 RX ADMIN — OXCARBAZEPINE 150 MILLIGRAM(S): 300 TABLET, FILM COATED ORAL at 17:37

## 2019-08-22 RX ADMIN — OXCARBAZEPINE 150 MILLIGRAM(S): 300 TABLET, FILM COATED ORAL at 05:36

## 2019-08-22 RX ADMIN — Medication 2 MILLIGRAM(S): at 21:36

## 2019-08-22 RX ADMIN — SODIUM CHLORIDE 125 MILLILITER(S): 9 INJECTION INTRAMUSCULAR; INTRAVENOUS; SUBCUTANEOUS at 16:21

## 2019-08-22 RX ADMIN — BUPROPION HYDROCHLORIDE 300 MILLIGRAM(S): 150 TABLET, EXTENDED RELEASE ORAL at 17:37

## 2019-08-22 NOTE — PROGRESS NOTE ADULT - SUBJECTIVE AND OBJECTIVE BOX
No pain, no shortness of breath      VITAL:  T(C): , Max: 37 (08-21-19 @ 17:13)  T(F): , Max: 98.6 (08-21-19 @ 17:13)  HR: 69 (08-22-19 @ 08:23)  BP: 115/74 (08-22-19 @ 08:23)  RR: 18 (08-22-19 @ 08:23)  SpO2: 95% (08-22-19 @ 08:23)      PHYSICAL EXAM:  Constitutional: NAD, Alert  HEENT: NCAT, DMM  Neck: Supple, No JVD  Respiratory: CTA-b/l  Cardiovascular: RRR s1s2, no m/r/g  Gastrointestinal: BS+, soft, NT/ND  Extremities: trace b/l LE edema  Neurological: (+)coarse RUE tremor; contracted right hand  Back: no CVAT b/l  Skin: (+)fibrotic changes b/l LE    LABS:                        11.2   5.21  )-----------( 223      ( 22 Aug 2019 08:16 )             36.3     Na(138)/K(4.5)/Cl(101)/HCO3(27)/BUN(25)/Cr(1.41)Glu(84)/Ca(9.5)/Mg(--)/PO4(--)    08-22 @ 06:22  Na(139)/K(4.2)/Cl(104)/HCO3(27)/BUN(23)/Cr(1.32)Glu(88)/Ca(9.0)/Mg(--)/PO4(--)    08-21 @ 05:07      IMPRESSION: 63M w/ HTN, DM2, CAD-CABG, CVA, and severe HFrEF, 8/16/19 from OSH s/p decompensated CHF and potentially for cardiac cath  (1)Renal - mild azotemia as of yesterday - prerenal - improved as of today  (2)LV thrombus - now on heparin gtt  (3)CHF - Entresto added today - no renal contraindication to Entresto here      RECOMMEND:  (1)Entresto as ordered  (2)Lasix as ordered  (3)A/C per primary team                Eriberto Fowler MD  [facility]  [phone] No pain, no shortness of breath  Patient states that he is going for cath today.    VITAL:  T(C): , Max: 37 (08-21-19 @ 17:13)  T(F): , Max: 98.6 (08-21-19 @ 17:13)  HR: 69 (08-22-19 @ 08:23)  BP: 115/74 (08-22-19 @ 08:23)  RR: 18 (08-22-19 @ 08:23)  SpO2: 95% (08-22-19 @ 08:23)      PHYSICAL EXAM:  Constitutional: NAD, Alert  HEENT: NCAT, DMM  Neck: Supple, No JVD  Respiratory: CTA-b/l  Cardiovascular: RRR s1s2, no m/r/g  Gastrointestinal: BS+, soft, NT/ND  Extremities: trace b/l LE edema  Neurological: (+)coarse RUE tremor; contracted right hand  Back: no CVAT b/l  Skin: (+)fibrotic changes b/l LE    LABS:                        11.2   5.21  )-----------( 223      ( 22 Aug 2019 08:16 )             36.3     Na(138)/K(4.5)/Cl(101)/HCO3(27)/BUN(25)/Cr(1.41)Glu(84)/Ca(9.5)/Mg(--)/PO4(--)    08-22 @ 06:22  Na(139)/K(4.2)/Cl(104)/HCO3(27)/BUN(23)/Cr(1.32)Glu(88)/Ca(9.0)/Mg(--)/PO4(--)    08-21 @ 05:07      IMPRESSION: 63M w/ HTN, DM2, CAD-CABG, CVA, and severe HFrEF, 8/16/19 from OSH s/p decompensated CHF and potentially for cardiac cath  (1)Renal - mild azotemia as of yesterday - prerenal - improved as of today  (2)LV thrombus - now on heparin gtt  (3)CHF - Entresto added today - no renal contraindication to Entresto here  (4)Cardiac - is he for cath today?    RECOMMEND:  (1)Entresto as ordered  (2)Would hold Lasix for now and treat periprocedurally with isotonic IVF (NS 250cc bolus pre-cath and 125cc/h x 4 hours post cath, if for cath today)  (3)A/C per primary team                Eriberto Fowler MD  [facility]  [phone]

## 2019-08-22 NOTE — PROGRESS NOTE ADULT - SUBJECTIVE AND OBJECTIVE BOX
Patient is a 63y old  Male who presents with a chief complaint of sob (21 Aug 2019 12:22)      SUBJECTIVE / OVERNIGHT EVENTS:  Overnight, there were no acute events.  On telemetry he was NSR 70-80 bpm.  The patient has no complaints of chest pain or shortness of breath.    MEDICATIONS  (STANDING):  ARIPiprazole 5 milliGRAM(s) Oral daily  ascorbic acid 500 milliGRAM(s) Oral daily  atorvastatin 80 milliGRAM(s) Oral at bedtime  buPROPion XL . 300 milliGRAM(s) Oral daily  clonazePAM  Tablet 2 milliGRAM(s) Oral at bedtime  clopidogrel Tablet 75 milliGRAM(s) Oral daily  docusate sodium 100 milliGRAM(s) Oral daily  DULoxetine 60 milliGRAM(s) Oral daily  famotidine    Tablet 40 milliGRAM(s) Oral daily  finasteride 5 milliGRAM(s) Oral daily  furosemide    Tablet 20 milliGRAM(s) Oral daily  heparin  Infusion.  Unit(s)/Hr (17 mL/Hr) IV Continuous <Continuous>  metoprolol succinate  milliGRAM(s) Oral daily  multivitamin/minerals 1 Tablet(s) Oral daily  nystatin Cream 1 Application(s) Topical two times a day  OXcarbazepine 150 milliGRAM(s) Oral two times a day  sacubitril 24 mG/valsartan 26 mG 1 Tablet(s) Oral two times a day  senna 1 Tablet(s) Oral at bedtime  sodium chloride 0.9% lock flush 3 milliLiter(s) IV Push every 8 hours  sodium chloride 0.9%. 1000 milliLiter(s) (250 mL/Hr) IV Continuous <Continuous>  sodium chloride 0.9%. 1000 milliLiter(s) (125 mL/Hr) IV Continuous <Continuous>  tamsulosin 0.4 milliGRAM(s) Oral at bedtime    MEDICATIONS  (PRN):  acetaminophen   Tablet .. 650 milliGRAM(s) Oral every 6 hours PRN Mild Pain (1 - 3)  heparin  Injectable 7500 Unit(s) IV Push every 6 hours PRN For aPTT less than 40  heparin  Injectable 3500 Unit(s) IV Push every 6 hours PRN For aPTT between 40 - 57      Vital Signs Last 24 Hrs  T(C): 36.6 (22 Aug 2019 08:23), Max: 37 (21 Aug 2019 17:13)  T(F): 97.8 (22 Aug 2019 08:23), Max: 98.6 (21 Aug 2019 17:13)  HR: 69 (22 Aug 2019 08:23) (67 - 80)  BP: 115/74 (22 Aug 2019 08:23) (97/- - 115/74)  BP(mean): --  RR: 18 (22 Aug 2019 08:23) (18 - 18)  SpO2: 95% (22 Aug 2019 08:23) (95% - 98%)  CAPILLARY BLOOD GLUCOSE        I&O's Summary    21 Aug 2019 07:01  -  22 Aug 2019 07:00  --------------------------------------------------------  IN: 1334 mL / OUT: 2000 mL / NET: -666 mL        PHYSICAL EXAM:  GENERAL: NAD, well-developed  HEAD:  Atraumatic, Normocephalic  EYES: EOMI, PERRLA, conjunctiva and sclera clear  NECK: Supple, No JVD  CHEST/LUNG: Clear to auscultation bilaterally; No wheeze  HEART: Regular rate and rhythm; No murmurs, rubs, or gallops  ABDOMEN: Soft, Nontender, Nondistended; Bowel sounds present  EXTREMITIES:  2+ Peripheral Pulses, No clubbing, cyanosis, or edema  PSYCH: AAOx3  NEUROLOGY: non-focal  SKIN: No rashes or lesions    LABS:                        11.2   5.21  )-----------( 223      ( 22 Aug 2019 08:16 )             36.3     08-22    138  |  101  |  25<H>  ----------------------------<  84  4.5   |  27  |  1.41<H>    Ca    9.5      22 Aug 2019 06:22      PT/INR - ( 22 Aug 2019 09:38 )   PT: 12.5 sec;   INR: 1.10 ratio         PTT - ( 22 Aug 2019 09:38 )  PTT:89.5 sec          RADIOLOGY & ADDITIONAL TESTS:    Imaging Personally Reviewed:    Consultant(s) Notes Reviewed:      Care Discussed with Consultants/Other Providers: Patient is a 63y old  Male who presents with a chief complaint of sob (21 Aug 2019 12:22)      SUBJECTIVE / OVERNIGHT EVENTS:  Overnight, there were no acute events.  On telemetry, he was NSR 70-80 bpm.  The patient has no complaints of chest pain or shortness of breath.    MEDICATIONS  (STANDING):  ARIPiprazole 5 milliGRAM(s) Oral daily  ascorbic acid 500 milliGRAM(s) Oral daily  atorvastatin 80 milliGRAM(s) Oral at bedtime  buPROPion XL . 300 milliGRAM(s) Oral daily  clonazePAM  Tablet 2 milliGRAM(s) Oral at bedtime  clopidogrel Tablet 75 milliGRAM(s) Oral daily  docusate sodium 100 milliGRAM(s) Oral daily  DULoxetine 60 milliGRAM(s) Oral daily  famotidine    Tablet 40 milliGRAM(s) Oral daily  finasteride 5 milliGRAM(s) Oral daily  furosemide    Tablet 20 milliGRAM(s) Oral daily  heparin  Infusion.  Unit(s)/Hr (17 mL/Hr) IV Continuous <Continuous>  metoprolol succinate  milliGRAM(s) Oral daily  multivitamin/minerals 1 Tablet(s) Oral daily  nystatin Cream 1 Application(s) Topical two times a day  OXcarbazepine 150 milliGRAM(s) Oral two times a day  sacubitril 24 mG/valsartan 26 mG 1 Tablet(s) Oral two times a day  senna 1 Tablet(s) Oral at bedtime  sodium chloride 0.9% lock flush 3 milliLiter(s) IV Push every 8 hours  sodium chloride 0.9%. 1000 milliLiter(s) (250 mL/Hr) IV Continuous <Continuous>  sodium chloride 0.9%. 1000 milliLiter(s) (125 mL/Hr) IV Continuous <Continuous>  tamsulosin 0.4 milliGRAM(s) Oral at bedtime    MEDICATIONS  (PRN):  acetaminophen   Tablet .. 650 milliGRAM(s) Oral every 6 hours PRN Mild Pain (1 - 3)  heparin  Injectable 7500 Unit(s) IV Push every 6 hours PRN For aPTT less than 40  heparin  Injectable 3500 Unit(s) IV Push every 6 hours PRN For aPTT between 40 - 57      Vital Signs Last 24 Hrs  T(C): 36.6 (22 Aug 2019 08:23), Max: 37 (21 Aug 2019 17:13)  T(F): 97.8 (22 Aug 2019 08:23), Max: 98.6 (21 Aug 2019 17:13)  HR: 69 (22 Aug 2019 08:23) (67 - 80)  BP: 115/74 (22 Aug 2019 08:23) (97/- - 115/74)  BP(mean): --  RR: 18 (22 Aug 2019 08:23) (18 - 18)  SpO2: 95% (22 Aug 2019 08:23) (95% - 98%)  CAPILLARY BLOOD GLUCOSE        I&O's Summary    21 Aug 2019 07:01  -  22 Aug 2019 07:00  --------------------------------------------------------  IN: 1334 mL / OUT: 2000 mL / NET: -666 mL        PHYSICAL EXAM:  GENERAL: NAD, well-developed  HEAD:  Atraumatic, Normocephalic  EYES: EOMI, PERRLA, conjunctiva and sclera clear  NECK: Supple, No JVD  CHEST/LUNG: Clear to auscultation bilaterally; No wheezes  HEART: Regular rate and rhythm; No murmurs, rubs, or gallops  ABDOMEN: Soft, Nontender, Nondistended; Bowel sounds present  EXTREMITIES:  2+ Peripheral Pulses, No clubbing, cyanosis, or edema  PSYCH: AAOx3  NEUROLOGY: non-focal  SKIN: No rashes or lesions    LABS:                        11.2   5.21  )-----------( 223      ( 22 Aug 2019 08:16 )             36.3     08-22    138  |  101  |  25<H>  ----------------------------<  84  4.5   |  27  |  1.41<H>    Ca    9.5      22 Aug 2019 06:22      PT/INR - ( 22 Aug 2019 09:38 )   PT: 12.5 sec;   INR: 1.10 ratio         PTT - ( 22 Aug 2019 09:38 )  PTT:89.5 sec          RADIOLOGY & ADDITIONAL TESTS:    Imaging Personally Reviewed:    Consultant(s) Notes Reviewed:      Care Discussed with Consultants/Other Providers:

## 2019-08-22 NOTE — PROGRESS NOTE ADULT - PROBLEM SELECTOR PLAN 1
- Stable and well compensated.  - TTE with EF 20%, mild MR, eccentric LVH, severe global LV systolic dysfunction  - patient will undergo cardiac cath today  - continue lasix 20 mg daily   - continue entresto 24-26 mg BID

## 2019-08-22 NOTE — PROGRESS NOTE ADULT - PROBLEM SELECTOR PLAN 2
TTE performed on 8/20 suggestive of apical, mural thrombus  - discontinue heparin gtt and begin eliquis 5 mg BID after LHC today

## 2019-08-22 NOTE — PROGRESS NOTE ADULT - SUBJECTIVE AND OBJECTIVE BOX
INTERVAL HPI/OVERNIGHT EVENTS: I  feel fine.   Vital Signs Last 24 Hrs  T(C): 36.6 (22 Aug 2019 12:21), Max: 37 (21 Aug 2019 17:13)  T(F): 97.9 (22 Aug 2019 12:21), Max: 98.6 (21 Aug 2019 17:13)  HR: 78 (22 Aug 2019 12:21) (67 - 79)  BP: 98/62 (22 Aug 2019 12:21) (98/62 - 115/74)  BP(mean): --  RR: 18 (22 Aug 2019 12:21) (18 - 18)  SpO2: 95% (22 Aug 2019 12:21) (95% - 97%)  I&O's Summary    21 Aug 2019 07:01  -  22 Aug 2019 07:00  --------------------------------------------------------  IN: 1334 mL / OUT: 2000 mL / NET: -666 mL      MEDICATIONS  (STANDING):  ARIPiprazole 5 milliGRAM(s) Oral daily  ascorbic acid 500 milliGRAM(s) Oral daily  atorvastatin 80 milliGRAM(s) Oral at bedtime  buPROPion XL . 300 milliGRAM(s) Oral daily  clonazePAM  Tablet 2 milliGRAM(s) Oral at bedtime  clopidogrel Tablet 75 milliGRAM(s) Oral daily  docusate sodium 100 milliGRAM(s) Oral daily  DULoxetine 60 milliGRAM(s) Oral daily  famotidine    Tablet 40 milliGRAM(s) Oral daily  finasteride 5 milliGRAM(s) Oral daily  furosemide    Tablet 20 milliGRAM(s) Oral daily  heparin  Infusion.  Unit(s)/Hr (17 mL/Hr) IV Continuous <Continuous>  metoprolol succinate  milliGRAM(s) Oral daily  multivitamin/minerals 1 Tablet(s) Oral daily  nystatin Cream 1 Application(s) Topical two times a day  OXcarbazepine 150 milliGRAM(s) Oral two times a day  sacubitril 24 mG/valsartan 26 mG 1 Tablet(s) Oral two times a day  senna 1 Tablet(s) Oral at bedtime  sodium chloride 0.9% lock flush 3 milliLiter(s) IV Push every 8 hours  sodium chloride 0.9%. 1000 milliLiter(s) (125 mL/Hr) IV Continuous <Continuous>  tamsulosin 0.4 milliGRAM(s) Oral at bedtime    MEDICATIONS  (PRN):  acetaminophen   Tablet .. 650 milliGRAM(s) Oral every 6 hours PRN Mild Pain (1 - 3)  heparin  Injectable 7500 Unit(s) IV Push every 6 hours PRN For aPTT less than 40  heparin  Injectable 3500 Unit(s) IV Push every 6 hours PRN For aPTT between 40 - 57    LABS:                        11.2   5.21  )-----------( 223      ( 22 Aug 2019 08:16 )             36.3     08-22    138  |  101  |  25<H>  ----------------------------<  84  4.5   |  27  |  1.41<H>    Ca    9.5      22 Aug 2019 06:22      PT/INR - ( 22 Aug 2019 09:38 )   PT: 12.5 sec;   INR: 1.10 ratio         PTT - ( 22 Aug 2019 09:38 )  PTT:89.5 sec    CAPILLARY BLOOD GLUCOSE              REVIEW OF SYSTEMS:  CONSTITUTIONAL: No fever, weight loss, or fatigue  EYES: No eye pain, visual disturbances, or discharge  RESPIRATORY: No cough, wheezing, chills or hemoptysis; No shortness of breath  CARDIOVASCULAR: No chest pain, palpitations, dizziness, or leg swelling  GASTROINTESTINAL: No abdominal or epigastric pain. No nausea, vomiting, or hematemesis; No diarrhea or constipation. No melena or hematochezia.  GENITOURINARY: No dysuria, frequency, hematuria, or incontinence  NEUROLOGICAL: No headaches, memory loss, loss of strength, numbness, or tremors    Consultant(s) Notes Reviewed:  [x ] YES  [ ] NO    PHYSICAL EXAM:  GENERAL: NAD, well-groomed, well-developed,not in any distress ,  HEAD:  Atraumatic, Normocephalic  EYES: EOMI, PERRLA, conjunctiva and sclera clear  NECK: Supple, No JVD, Normal thyroid  NERVOUS SYSTEM:  Alert & Oriented X3, No focal deficit   CHEST/LUNG: Good air entry bilateral with no  rales, rhonchi, wheezing, or rubs  HEART: Regular rate and rhythm; No murmurs, rubs, or gallops  ABDOMEN: Soft, Nontender, Nondistended; Bowel sounds present  EXTREMITIES:  2+ Peripheral Pulses, No clubbing, cyanosis, or edema    Care Discussed with Consultants/Other Providers [ x] YES  [ ] NO

## 2019-08-23 LAB
APTT BLD: 32.8 SEC — SIGNIFICANT CHANGE UP (ref 27.5–36.3)
HCT VFR BLD CALC: 37.1 % — LOW (ref 39–50)
HGB BLD-MCNC: 11.5 G/DL — LOW (ref 13–17)
MCHC RBC-ENTMCNC: 27.1 PG — SIGNIFICANT CHANGE UP (ref 27–34)
MCHC RBC-ENTMCNC: 31 GM/DL — LOW (ref 32–36)
MCV RBC AUTO: 87.3 FL — SIGNIFICANT CHANGE UP (ref 80–100)
PLATELET # BLD AUTO: 235 K/UL — SIGNIFICANT CHANGE UP (ref 150–400)
RBC # BLD: 4.25 M/UL — SIGNIFICANT CHANGE UP (ref 4.2–5.8)
RBC # FLD: 18.3 % — HIGH (ref 10.3–14.5)
WBC # BLD: 5.05 K/UL — SIGNIFICANT CHANGE UP (ref 3.8–10.5)
WBC # FLD AUTO: 5.05 K/UL — SIGNIFICANT CHANGE UP (ref 3.8–10.5)

## 2019-08-23 PROCEDURE — 99233 SBSQ HOSP IP/OBS HIGH 50: CPT | Mod: GC

## 2019-08-23 RX ADMIN — DULOXETINE HYDROCHLORIDE 60 MILLIGRAM(S): 30 CAPSULE, DELAYED RELEASE ORAL at 11:57

## 2019-08-23 RX ADMIN — SENNA PLUS 1 TABLET(S): 8.6 TABLET ORAL at 21:35

## 2019-08-23 RX ADMIN — OXCARBAZEPINE 150 MILLIGRAM(S): 300 TABLET, FILM COATED ORAL at 05:40

## 2019-08-23 RX ADMIN — ARIPIPRAZOLE 5 MILLIGRAM(S): 15 TABLET ORAL at 11:56

## 2019-08-23 RX ADMIN — OXCARBAZEPINE 150 MILLIGRAM(S): 300 TABLET, FILM COATED ORAL at 18:23

## 2019-08-23 RX ADMIN — TAMSULOSIN HYDROCHLORIDE 0.4 MILLIGRAM(S): 0.4 CAPSULE ORAL at 21:35

## 2019-08-23 RX ADMIN — SACUBITRIL AND VALSARTAN 1 TABLET(S): 24; 26 TABLET, FILM COATED ORAL at 18:23

## 2019-08-23 RX ADMIN — Medication 100 MILLIGRAM(S): at 05:40

## 2019-08-23 RX ADMIN — SODIUM CHLORIDE 3 MILLILITER(S): 9 INJECTION INTRAMUSCULAR; INTRAVENOUS; SUBCUTANEOUS at 21:40

## 2019-08-23 RX ADMIN — FAMOTIDINE 40 MILLIGRAM(S): 10 INJECTION INTRAVENOUS at 11:57

## 2019-08-23 RX ADMIN — SODIUM CHLORIDE 3 MILLILITER(S): 9 INJECTION INTRAMUSCULAR; INTRAVENOUS; SUBCUTANEOUS at 05:41

## 2019-08-23 RX ADMIN — NYSTATIN CREAM 1 APPLICATION(S): 100000 CREAM TOPICAL at 18:23

## 2019-08-23 RX ADMIN — APIXABAN 5 MILLIGRAM(S): 2.5 TABLET, FILM COATED ORAL at 05:40

## 2019-08-23 RX ADMIN — ATORVASTATIN CALCIUM 80 MILLIGRAM(S): 80 TABLET, FILM COATED ORAL at 21:35

## 2019-08-23 RX ADMIN — Medication 20 MILLIGRAM(S): at 05:40

## 2019-08-23 RX ADMIN — Medication 2 MILLIGRAM(S): at 21:35

## 2019-08-23 RX ADMIN — Medication 500 MILLIGRAM(S): at 11:56

## 2019-08-23 RX ADMIN — FINASTERIDE 5 MILLIGRAM(S): 5 TABLET, FILM COATED ORAL at 11:57

## 2019-08-23 RX ADMIN — Medication 100 MILLIGRAM(S): at 11:56

## 2019-08-23 RX ADMIN — BUPROPION HYDROCHLORIDE 300 MILLIGRAM(S): 150 TABLET, EXTENDED RELEASE ORAL at 11:56

## 2019-08-23 RX ADMIN — SACUBITRIL AND VALSARTAN 1 TABLET(S): 24; 26 TABLET, FILM COATED ORAL at 05:40

## 2019-08-23 RX ADMIN — NYSTATIN CREAM 1 APPLICATION(S): 100000 CREAM TOPICAL at 05:41

## 2019-08-23 RX ADMIN — Medication 1 TABLET(S): at 11:57

## 2019-08-23 RX ADMIN — APIXABAN 5 MILLIGRAM(S): 2.5 TABLET, FILM COATED ORAL at 18:23

## 2019-08-23 RX ADMIN — CLOPIDOGREL BISULFATE 75 MILLIGRAM(S): 75 TABLET, FILM COATED ORAL at 11:57

## 2019-08-23 RX ADMIN — SODIUM CHLORIDE 3 MILLILITER(S): 9 INJECTION INTRAMUSCULAR; INTRAVENOUS; SUBCUTANEOUS at 12:47

## 2019-08-23 NOTE — PROGRESS NOTE ADULT - SUBJECTIVE AND OBJECTIVE BOX
INTERVAL HPI/OVERNIGHT EVENTS: I feel fine. I want to go home.   Vital Signs Last 24 Hrs  T(C): 36.5 (23 Aug 2019 08:06), Max: 36.9 (22 Aug 2019 20:34)  T(F): 97.7 (23 Aug 2019 08:06), Max: 98.4 (22 Aug 2019 20:34)  HR: 75 (23 Aug 2019 08:06) (65 - 86)  BP: 110/67 (23 Aug 2019 08:06) (98/62 - 125/68)  BP(mean): --  RR: 18 (23 Aug 2019 08:06) (18 - 18)  SpO2: 98% (23 Aug 2019 08:06) (95% - 99%)  I&O's Summary    22 Aug 2019 07:01  -  23 Aug 2019 07:00  --------------------------------------------------------  IN: 340 mL / OUT: 1200 mL / NET: -860 mL      MEDICATIONS  (STANDING):  apixaban 5 milliGRAM(s) Oral every 12 hours  ARIPiprazole 5 milliGRAM(s) Oral daily  ascorbic acid 500 milliGRAM(s) Oral daily  atorvastatin 80 milliGRAM(s) Oral at bedtime  buPROPion XL . 300 milliGRAM(s) Oral daily  clonazePAM  Tablet 2 milliGRAM(s) Oral at bedtime  clopidogrel Tablet 75 milliGRAM(s) Oral daily  docusate sodium 100 milliGRAM(s) Oral daily  DULoxetine 60 milliGRAM(s) Oral daily  famotidine    Tablet 40 milliGRAM(s) Oral daily  finasteride 5 milliGRAM(s) Oral daily  furosemide    Tablet 20 milliGRAM(s) Oral daily  metoprolol succinate  milliGRAM(s) Oral daily  multivitamin/minerals 1 Tablet(s) Oral daily  nystatin Cream 1 Application(s) Topical two times a day  OXcarbazepine 150 milliGRAM(s) Oral two times a day  sacubitril 24 mG/valsartan 26 mG 1 Tablet(s) Oral two times a day  senna 1 Tablet(s) Oral at bedtime  sodium chloride 0.9% lock flush 3 milliLiter(s) IV Push every 8 hours  tamsulosin 0.4 milliGRAM(s) Oral at bedtime    MEDICATIONS  (PRN):  acetaminophen   Tablet .. 650 milliGRAM(s) Oral every 6 hours PRN Mild Pain (1 - 3)    LABS:                        11.5   5.05  )-----------( 235      ( 23 Aug 2019 09:42 )             37.1     08-22    138  |  101  |  25<H>  ----------------------------<  84  4.5   |  27  |  1.41<H>    Ca    9.5      22 Aug 2019 06:22      PT/INR - ( 22 Aug 2019 09:38 )   PT: 12.5 sec;   INR: 1.10 ratio         PTT - ( 23 Aug 2019 09:44 )  PTT:32.8 sec    CAPILLARY BLOOD GLUCOSE              REVIEW OF SYSTEMS:  CONSTITUTIONAL: No fever, weight loss, or fatigue  EYES: No eye pain, visual disturbances, or discharge  ENMT:  No difficulty hearing, tinnitus, vertigo; No sinus or throat pain  NECK: No pain or stiffness  RESPIRATORY: No cough, wheezing, chills or hemoptysis; No shortness of breath  CARDIOVASCULAR: No chest pain, palpitations, dizziness, or leg swelling  GASTROINTESTINAL: No abdominal or epigastric pain. No nausea, vomiting, or hematemesis; No diarrhea or constipation. No melena or hematochezia.  GENITOURINARY: No dysuria, frequency, hematuria, or incontinence  NEUROLOGICAL: No headaches, memory loss, loss of strength, numbness, or tremors    RADIOLOGY & ADDITIONAL TESTS:    Consultant(s) Notes Reviewed:  [x ] YES  [ ] NO    PHYSICAL EXAM:  GENERAL: NAD, well-groomed, well-developed, not in any distress ,  HEAD:  Atraumatic, Normocephalic  EYES: EOMI, PERRLA, conjunctiva and sclera clear  NECK: Supple, No JVD, Normal thyroid  NERVOUS SYSTEM:  Alert & Oriented X3, No focal deficit   CHEST/LUNG: Good air entry bilateral with no  rales, rhonchi, wheezing, or rubs  HEART: Regular rate and rhythm; No murmurs, rubs, or gallops  ABDOMEN: Soft, Nontender, Nondistended; Bowel sounds present  EXTREMITIES:  No clubbing, cyanosis, or edema  Sacral decubitus+    Care Discussed with Consultants/Other Providers [ x] YES  [ ] NO

## 2019-08-23 NOTE — PROGRESS NOTE ADULT - SUBJECTIVE AND OBJECTIVE BOX
Patient is a 63y old  Male who presents with a chief complaint of sob (23 Aug 2019 10:26)      SUBJECTIVE / OVERNIGHT EVENTS:  There were no acute events o    MEDICATIONS  (STANDING):  apixaban 5 milliGRAM(s) Oral every 12 hours  ARIPiprazole 5 milliGRAM(s) Oral daily  ascorbic acid 500 milliGRAM(s) Oral daily  atorvastatin 80 milliGRAM(s) Oral at bedtime  buPROPion XL . 300 milliGRAM(s) Oral daily  clonazePAM  Tablet 2 milliGRAM(s) Oral at bedtime  clopidogrel Tablet 75 milliGRAM(s) Oral daily  docusate sodium 100 milliGRAM(s) Oral daily  DULoxetine 60 milliGRAM(s) Oral daily  famotidine    Tablet 40 milliGRAM(s) Oral daily  finasteride 5 milliGRAM(s) Oral daily  furosemide    Tablet 20 milliGRAM(s) Oral daily  metoprolol succinate  milliGRAM(s) Oral daily  multivitamin/minerals 1 Tablet(s) Oral daily  nystatin Cream 1 Application(s) Topical two times a day  OXcarbazepine 150 milliGRAM(s) Oral two times a day  sacubitril 24 mG/valsartan 26 mG 1 Tablet(s) Oral two times a day  senna 1 Tablet(s) Oral at bedtime  sodium chloride 0.9% lock flush 3 milliLiter(s) IV Push every 8 hours  tamsulosin 0.4 milliGRAM(s) Oral at bedtime    MEDICATIONS  (PRN):  acetaminophen   Tablet .. 650 milliGRAM(s) Oral every 6 hours PRN Mild Pain (1 - 3)      Vital Signs Last 24 Hrs  T(C): 36.5 (23 Aug 2019 08:06), Max: 36.9 (22 Aug 2019 20:34)  T(F): 97.7 (23 Aug 2019 08:06), Max: 98.4 (22 Aug 2019 20:34)  HR: 75 (23 Aug 2019 08:06) (65 - 86)  BP: 110/67 (23 Aug 2019 08:06) (98/62 - 125/68)  BP(mean): --  RR: 18 (23 Aug 2019 08:06) (18 - 18)  SpO2: 98% (23 Aug 2019 08:06) (95% - 99%)  CAPILLARY BLOOD GLUCOSE        I&O's Summary    22 Aug 2019 07:01  -  23 Aug 2019 07:00  --------------------------------------------------------  IN: 340 mL / OUT: 1200 mL / NET: -860 mL        PHYSICAL EXAM:  GENERAL: NAD, well-developed  HEAD:  Atraumatic, Normocephalic  EYES: EOMI, PERRLA, conjunctiva and sclera clear  NECK: Supple, No JVD  CHEST/LUNG: Clear to auscultation bilaterally; No wheeze  HEART: Regular rate and rhythm; No murmurs, rubs, or gallops  ABDOMEN: Soft, Nontender, Nondistended; Bowel sounds present  EXTREMITIES:  2+ Peripheral Pulses, No clubbing, cyanosis, or edema  PSYCH: AAOx3  NEUROLOGY: non-focal  SKIN: No rashes or lesions    LABS:                        11.5   5.05  )-----------( 235      ( 23 Aug 2019 09:42 )             37.1     08-22    138  |  101  |  25<H>  ----------------------------<  84  4.5   |  27  |  1.41<H>    Ca    9.5      22 Aug 2019 06:22      PT/INR - ( 22 Aug 2019 09:38 )   PT: 12.5 sec;   INR: 1.10 ratio         PTT - ( 23 Aug 2019 09:44 )  PTT:32.8 sec          RADIOLOGY & ADDITIONAL TESTS:    Imaging Personally Reviewed:    Consultant(s) Notes Reviewed:      Care Discussed with Consultants/Other Providers: Patient is a 63y old  Male who presents with a chief complaint of sob (23 Aug 2019 10:26)      SUBJECTIVE / OVERNIGHT EVENTS:  There were no acute events overnight.  He was normal sinus rhythm on telemetry.  He has no complaints of chest pain or shortness of breath.    MEDICATIONS  (STANDING):  apixaban 5 milliGRAM(s) Oral every 12 hours  ARIPiprazole 5 milliGRAM(s) Oral daily  ascorbic acid 500 milliGRAM(s) Oral daily  atorvastatin 80 milliGRAM(s) Oral at bedtime  buPROPion XL . 300 milliGRAM(s) Oral daily  clonazePAM  Tablet 2 milliGRAM(s) Oral at bedtime  clopidogrel Tablet 75 milliGRAM(s) Oral daily  docusate sodium 100 milliGRAM(s) Oral daily  DULoxetine 60 milliGRAM(s) Oral daily  famotidine    Tablet 40 milliGRAM(s) Oral daily  finasteride 5 milliGRAM(s) Oral daily  furosemide    Tablet 20 milliGRAM(s) Oral daily  metoprolol succinate  milliGRAM(s) Oral daily  multivitamin/minerals 1 Tablet(s) Oral daily  nystatin Cream 1 Application(s) Topical two times a day  OXcarbazepine 150 milliGRAM(s) Oral two times a day  sacubitril 24 mG/valsartan 26 mG 1 Tablet(s) Oral two times a day  senna 1 Tablet(s) Oral at bedtime  sodium chloride 0.9% lock flush 3 milliLiter(s) IV Push every 8 hours  tamsulosin 0.4 milliGRAM(s) Oral at bedtime    MEDICATIONS  (PRN):  acetaminophen   Tablet .. 650 milliGRAM(s) Oral every 6 hours PRN Mild Pain (1 - 3)      Vital Signs Last 24 Hrs  T(C): 36.5 (23 Aug 2019 08:06), Max: 36.9 (22 Aug 2019 20:34)  T(F): 97.7 (23 Aug 2019 08:06), Max: 98.4 (22 Aug 2019 20:34)  HR: 75 (23 Aug 2019 08:06) (65 - 86)  BP: 110/67 (23 Aug 2019 08:06) (98/62 - 125/68)  BP(mean): --  RR: 18 (23 Aug 2019 08:06) (18 - 18)  SpO2: 98% (23 Aug 2019 08:06) (95% - 99%)  CAPILLARY BLOOD GLUCOSE        I&O's Summary    22 Aug 2019 07:01  -  23 Aug 2019 07:00  --------------------------------------------------------  IN: 340 mL / OUT: 1200 mL / NET: -860 mL        PHYSICAL EXAM:  GENERAL: NAD, well-developed  HEAD:  Atraumatic, Normocephalic  EYES: EOMI, PERRLA, conjunctiva and sclera clear  NECK: Supple, No JVD  CHEST/LUNG: Clear to auscultation bilaterally; No wheeze  HEART: Regular rate and rhythm; No murmurs, rubs, or gallops  ABDOMEN: Soft, Nontender, Nondistended; Bowel sounds present  EXTREMITIES:  2+ Peripheral Pulses, No clubbing, cyanosis, or edema  PSYCH: AAOx3  NEUROLOGY: non-focal  SKIN: No rashes or lesions    LABS:                        11.5   5.05  )-----------( 235      ( 23 Aug 2019 09:42 )             37.1     08-22    138  |  101  |  25<H>  ----------------------------<  84  4.5   |  27  |  1.41<H>    Ca    9.5      22 Aug 2019 06:22      PT/INR - ( 22 Aug 2019 09:38 )   PT: 12.5 sec;   INR: 1.10 ratio         PTT - ( 23 Aug 2019 09:44 )  PTT:32.8 sec          RADIOLOGY & ADDITIONAL TESTS:    Imaging Personally Reviewed:    Consultant(s) Notes Reviewed:      Care Discussed with Consultants/Other Providers:

## 2019-08-23 NOTE — PROGRESS NOTE ADULT - PROBLEM SELECTOR PLAN 1
- Stable and well compensated.  - TTE with EF 20%, mild MR, eccentric LVH, severe global LV systolic dysfunction  - Cardiac cath with normal right atrial pressure, but elevated wedge pressure and cardiac output  - will defer intervention until patient is better optimized  - continue lasix 20 mg daily   - continue entresto 24-26 mg BID  - follow up as an outpatient with Dr. Padron. - Stable and well compensated.  - TTE with EF 20%, mild MR, eccentric LVH, severe global LV systolic dysfunction  - Cardiac cath with coronary artery disease and elevated left sided pressures  - will defer intervention and continue with medical optimization as an outpatient  - continue lasix 20 mg daily   - continue entresto 24-26 mg BID  - A follow up appointment with the [facility] has been scheduled for 9/9/19 at 10:00 am at [facility]. Office Number is [phone].  - follow up as an outpatient with Dr. Padron.

## 2019-08-23 NOTE — PROGRESS NOTE ADULT - SUBJECTIVE AND OBJECTIVE BOX
No pain, no shortness of breath      VITAL:  T(C): , Max: 36.9 (08-22-19 @ 20:34)  T(F): , Max: 98.4 (08-22-19 @ 20:34)  HR: 75 (08-23-19 @ 08:06)  BP: 110/67 (08-23-19 @ 08:06)  RR: 18 (08-23-19 @ 08:06)  SpO2: 98% (08-23-19 @ 08:06)      PHYSICAL EXAM:  Constitutional: NAD, Alert  HEENT: NCAT, DMM  Neck: Supple, No JVD  Respiratory: CTA-b/l  Cardiovascular: RRR s1s2, no m/r/g  Gastrointestinal: BS+, soft, NT/ND  Extremities: trace b/l LE edema  Neurological: (+)coarse RUE tremor; contracted right hand  Back: no CVAT b/l  Skin: (+)fibrotic changes b/l LE    LABS:                        11.2   5.21  )-----------( 223      ( 22 Aug 2019 08:16 )             36.3     Na(138)/K(4.5)/Cl(101)/HCO3(27)/BUN(25)/Cr(1.41)Glu(84)/Ca(9.5)/Mg(--)/PO4(--)    08-22 @ 06:22  Na(139)/K(4.2)/Cl(104)/HCO3(27)/BUN(23)/Cr(1.32)Glu(88)/Ca(9.0)/Mg(--)/PO4(--)    08-21 @ 05:07    CATH (8/22/19)  8/22 s/p diagnostic cath R&LHC via RFA/V, Angiosealed   Ostial LAD (80%), prox LCx (100%), ostial RCA (70%), distal RCA (80-90%)  Lima-LAD patent, SVG closed from prior study    PA 34/18/26, CO 4.09, LVEDP 28%      IMPRESSION: 63M w/ HTN, DM2, CAD-CABG, CVA, and severe HFrEF, 8/16/19 from OSH s/p decompensated CHF and potentially for cardiac cath  (1)Renal - mild azotemia as of yesterday - prerenal - improved as of today  (2)LV thrombus - now on heparin gtt  (3)CHF - Entresto added today - no renal contraindication to Entresto here  (4)Cardiac - s/p diagnostic cath yesterday; is PCI needed?      RECOMMEND:  (1)Entresto and Lasix as ordered  (2)Periprocedural fluid as given yesterday if for PCI this admission  (3)A/C per primary team  (4)Dose new meds for GFR 40-50ml/min          Eriberto Fowler MD  [facility]  [phone] No pain, no shortness of breath      VITAL:  T(C): , Max: 36.9 (08-22-19 @ 20:34)  T(F): , Max: 98.4 (08-22-19 @ 20:34)  HR: 75 (08-23-19 @ 08:06)  BP: 110/67 (08-23-19 @ 08:06)  RR: 18 (08-23-19 @ 08:06)  SpO2: 98% (08-23-19 @ 08:06)      PHYSICAL EXAM:  Constitutional: NAD, Alert  HEENT: NCAT, DMM  Neck: Supple, No JVD  Respiratory: CTA-b/l  Cardiovascular: RRR s1s2, no m/r/g  Gastrointestinal: BS+, soft, NT/ND  Extremities: trace b/l LE edema  Neurological: (+)coarse RUE tremor; contracted right hand  Back: no CVAT b/l  Skin: (+)fibrotic changes b/l LE    LABS:                        11.2   5.21  )-----------( 223      ( 22 Aug 2019 08:16 )             36.3     Na(138)/K(4.5)/Cl(101)/HCO3(27)/BUN(25)/Cr(1.41)Glu(84)/Ca(9.5)/Mg(--)/PO4(--)    08-22 @ 06:22  Na(139)/K(4.2)/Cl(104)/HCO3(27)/BUN(23)/Cr(1.32)Glu(88)/Ca(9.0)/Mg(--)/PO4(--)    08-21 @ 05:07    CATH (8/22/19)  8/22 s/p diagnostic cath R&LHC via RFA/V, Angiosealed   Ostial LAD (80%), prox LCx (100%), ostial RCA (70%), distal RCA (80-90%)  Lima-LAD patent, SVG closed from prior study    PA 34/18/26, CO 4.09, LVEDP 28%      IMPRESSION: 63M w/ HTN, DM2, CAD-CABG, CVA, and severe HFrEF, 8/16/19 from OSH s/p decompensated CHF and potentially for cardiac cath  (1)Renal - mild azotemia as of yesterday - prerenal - improved as of today  (2)LV thrombus - now on heparin gtt  (3)Cardiac - s/p diagnostic cath yesterday; is PCI needed?      RECOMMEND:  (1)Entresto and Lasix as ordered  (2)Periprocedural fluid as given yesterday if for PCI this admission  (3)A/C per primary team  (4)Dose new meds for GFR 40-50ml/min          Eriberto Fowler MD  [facility]  [phone]

## 2019-08-24 LAB
HCT VFR BLD CALC: 36.3 % — LOW (ref 39–50)
HGB BLD-MCNC: 11.2 G/DL — LOW (ref 13–17)
MCHC RBC-ENTMCNC: 27.7 PG — SIGNIFICANT CHANGE UP (ref 27–34)
MCHC RBC-ENTMCNC: 30.9 GM/DL — LOW (ref 32–36)
MCV RBC AUTO: 89.9 FL — SIGNIFICANT CHANGE UP (ref 80–100)
PLATELET # BLD AUTO: 227 K/UL — SIGNIFICANT CHANGE UP (ref 150–400)
RBC # BLD: 4.04 M/UL — LOW (ref 4.2–5.8)
RBC # FLD: 18 % — HIGH (ref 10.3–14.5)
WBC # BLD: 5.78 K/UL — SIGNIFICANT CHANGE UP (ref 3.8–10.5)
WBC # FLD AUTO: 5.78 K/UL — SIGNIFICANT CHANGE UP (ref 3.8–10.5)

## 2019-08-24 PROCEDURE — 99233 SBSQ HOSP IP/OBS HIGH 50: CPT | Mod: GC

## 2019-08-24 RX ORDER — FAMOTIDINE 10 MG/ML
1 INJECTION INTRAVENOUS
Qty: 0 | Refills: 0 | DISCHARGE

## 2019-08-24 RX ORDER — CLONAZEPAM 1 MG
2 TABLET ORAL AT BEDTIME
Refills: 0 | Status: DISCONTINUED | OUTPATIENT
Start: 2019-08-24 | End: 2019-08-26

## 2019-08-24 RX ORDER — AMLODIPINE BESYLATE 2.5 MG/1
1 TABLET ORAL
Qty: 0 | Refills: 0 | DISCHARGE

## 2019-08-24 RX ORDER — LISINOPRIL 2.5 MG/1
1 TABLET ORAL
Qty: 0 | Refills: 0 | DISCHARGE

## 2019-08-24 RX ORDER — ATORVASTATIN CALCIUM 80 MG/1
1 TABLET, FILM COATED ORAL
Qty: 0 | Refills: 0 | DISCHARGE
Start: 2019-08-24

## 2019-08-24 RX ORDER — ASPIRIN/CALCIUM CARB/MAGNESIUM 324 MG
1 TABLET ORAL
Qty: 0 | Refills: 0 | DISCHARGE

## 2019-08-24 RX ORDER — APIXABAN 2.5 MG/1
1 TABLET, FILM COATED ORAL
Qty: 0 | Refills: 0 | DISCHARGE
Start: 2019-08-24

## 2019-08-24 RX ORDER — CLONAZEPAM 1 MG
1 TABLET ORAL
Qty: 0 | Refills: 0 | DISCHARGE

## 2019-08-24 RX ORDER — METOPROLOL TARTRATE 50 MG
1 TABLET ORAL
Qty: 30 | Refills: 0
Start: 2019-08-24 | End: 2019-09-22

## 2019-08-24 RX ORDER — METOPROLOL TARTRATE 50 MG
1 TABLET ORAL
Qty: 0 | Refills: 0 | DISCHARGE

## 2019-08-24 RX ORDER — CLOPIDOGREL BISULFATE 75 MG/1
1 TABLET, FILM COATED ORAL
Qty: 0 | Refills: 0 | DISCHARGE

## 2019-08-24 RX ORDER — MULTIVIT-MIN/FERROUS GLUCONATE 9 MG/15 ML
1 LIQUID (ML) ORAL
Qty: 0 | Refills: 0 | DISCHARGE

## 2019-08-24 RX ORDER — ROSUVASTATIN CALCIUM 5 MG/1
1 TABLET ORAL
Qty: 0 | Refills: 0 | DISCHARGE

## 2019-08-24 RX ORDER — APIXABAN 2.5 MG/1
1 TABLET, FILM COATED ORAL
Qty: 60 | Refills: 0
Start: 2019-08-24 | End: 2019-09-22

## 2019-08-24 RX ORDER — BUPROPION HYDROCHLORIDE 150 MG/1
1 TABLET, EXTENDED RELEASE ORAL
Qty: 0 | Refills: 0 | DISCHARGE

## 2019-08-24 RX ORDER — ASCORBIC ACID 60 MG
1 TABLET,CHEWABLE ORAL
Qty: 0 | Refills: 0 | DISCHARGE
Start: 2019-08-24

## 2019-08-24 RX ORDER — FUROSEMIDE 40 MG
1 TABLET ORAL
Qty: 30 | Refills: 0
Start: 2019-08-24 | End: 2019-09-22

## 2019-08-24 RX ORDER — SACUBITRIL AND VALSARTAN 24; 26 MG/1; MG/1
1 TABLET, FILM COATED ORAL
Qty: 0 | Refills: 0 | DISCHARGE
Start: 2019-08-24

## 2019-08-24 RX ORDER — SACUBITRIL AND VALSARTAN 24; 26 MG/1; MG/1
1 TABLET, FILM COATED ORAL
Qty: 60 | Refills: 0
Start: 2019-08-24 | End: 2019-09-22

## 2019-08-24 RX ORDER — FUROSEMIDE 40 MG
1 TABLET ORAL
Qty: 0 | Refills: 0 | DISCHARGE
Start: 2019-08-24

## 2019-08-24 RX ADMIN — CLOPIDOGREL BISULFATE 75 MILLIGRAM(S): 75 TABLET, FILM COATED ORAL at 11:27

## 2019-08-24 RX ADMIN — Medication 20 MILLIGRAM(S): at 05:16

## 2019-08-24 RX ADMIN — Medication 2 MILLIGRAM(S): at 21:15

## 2019-08-24 RX ADMIN — APIXABAN 5 MILLIGRAM(S): 2.5 TABLET, FILM COATED ORAL at 05:16

## 2019-08-24 RX ADMIN — SACUBITRIL AND VALSARTAN 1 TABLET(S): 24; 26 TABLET, FILM COATED ORAL at 17:28

## 2019-08-24 RX ADMIN — SODIUM CHLORIDE 3 MILLILITER(S): 9 INJECTION INTRAMUSCULAR; INTRAVENOUS; SUBCUTANEOUS at 05:17

## 2019-08-24 RX ADMIN — APIXABAN 5 MILLIGRAM(S): 2.5 TABLET, FILM COATED ORAL at 17:28

## 2019-08-24 RX ADMIN — FAMOTIDINE 40 MILLIGRAM(S): 10 INJECTION INTRAVENOUS at 11:26

## 2019-08-24 RX ADMIN — TAMSULOSIN HYDROCHLORIDE 0.4 MILLIGRAM(S): 0.4 CAPSULE ORAL at 21:15

## 2019-08-24 RX ADMIN — SENNA PLUS 1 TABLET(S): 8.6 TABLET ORAL at 21:15

## 2019-08-24 RX ADMIN — OXCARBAZEPINE 150 MILLIGRAM(S): 300 TABLET, FILM COATED ORAL at 05:16

## 2019-08-24 RX ADMIN — Medication 1 TABLET(S): at 11:25

## 2019-08-24 RX ADMIN — NYSTATIN CREAM 1 APPLICATION(S): 100000 CREAM TOPICAL at 17:28

## 2019-08-24 RX ADMIN — NYSTATIN CREAM 1 APPLICATION(S): 100000 CREAM TOPICAL at 05:16

## 2019-08-24 RX ADMIN — SODIUM CHLORIDE 3 MILLILITER(S): 9 INJECTION INTRAMUSCULAR; INTRAVENOUS; SUBCUTANEOUS at 21:13

## 2019-08-24 RX ADMIN — Medication 100 MILLIGRAM(S): at 05:16

## 2019-08-24 RX ADMIN — SACUBITRIL AND VALSARTAN 1 TABLET(S): 24; 26 TABLET, FILM COATED ORAL at 05:15

## 2019-08-24 RX ADMIN — FINASTERIDE 5 MILLIGRAM(S): 5 TABLET, FILM COATED ORAL at 11:26

## 2019-08-24 RX ADMIN — ATORVASTATIN CALCIUM 80 MILLIGRAM(S): 80 TABLET, FILM COATED ORAL at 21:14

## 2019-08-24 RX ADMIN — BUPROPION HYDROCHLORIDE 300 MILLIGRAM(S): 150 TABLET, EXTENDED RELEASE ORAL at 11:25

## 2019-08-24 RX ADMIN — OXCARBAZEPINE 150 MILLIGRAM(S): 300 TABLET, FILM COATED ORAL at 17:28

## 2019-08-24 RX ADMIN — Medication 500 MILLIGRAM(S): at 11:26

## 2019-08-24 RX ADMIN — SODIUM CHLORIDE 3 MILLILITER(S): 9 INJECTION INTRAMUSCULAR; INTRAVENOUS; SUBCUTANEOUS at 14:32

## 2019-08-24 RX ADMIN — ARIPIPRAZOLE 5 MILLIGRAM(S): 15 TABLET ORAL at 11:26

## 2019-08-24 RX ADMIN — DULOXETINE HYDROCHLORIDE 60 MILLIGRAM(S): 30 CAPSULE, DELAYED RELEASE ORAL at 11:25

## 2019-08-24 NOTE — PROGRESS NOTE ADULT - PROBLEM SELECTOR PLAN 1
- Stable and well compensated.  - continue lasix 20 mg daily   - continue entresto 24-26 mg BID  - A follow up appointment with the Heart Failure Clinic has been scheduled for 9/9/19 at 10:00 am at [facility]. Office Number is [phone].  - follow up as an outpatient with Dr. Padron.

## 2019-08-24 NOTE — PROGRESS NOTE ADULT - SUBJECTIVE AND OBJECTIVE BOX
INTERVAL HPI/OVERNIGHT EVENTS: I want to go home   Vital Signs Last 24 Hrs  T(C): 36.6 (24 Aug 2019 08:40), Max: 37.6 (23 Aug 2019 20:16)  T(F): 97.9 (24 Aug 2019 08:40), Max: 99.7 (23 Aug 2019 20:16)  HR: 82 (24 Aug 2019 08:40) (64 - 86)  BP: 95/58 (24 Aug 2019 08:40) (95/58 - 120/70)  BP(mean): --  RR: 18 (24 Aug 2019 08:40) (18 - 18)  SpO2: 97% (24 Aug 2019 08:40) (96% - 98%)  I&O's Summary    23 Aug 2019 07:01  -  24 Aug 2019 07:00  --------------------------------------------------------  IN: 480 mL / OUT: 1750 mL / NET: -1270 mL    24 Aug 2019 07:01  -  24 Aug 2019 12:20  --------------------------------------------------------  IN: 290 mL / OUT: 0 mL / NET: 290 mL      MEDICATIONS  (STANDING):  apixaban 5 milliGRAM(s) Oral every 12 hours  ARIPiprazole 5 milliGRAM(s) Oral daily  ascorbic acid 500 milliGRAM(s) Oral daily  atorvastatin 80 milliGRAM(s) Oral at bedtime  buPROPion XL . 300 milliGRAM(s) Oral daily  clonazePAM  Tablet 2 milliGRAM(s) Oral at bedtime  clopidogrel Tablet 75 milliGRAM(s) Oral daily  docusate sodium 100 milliGRAM(s) Oral daily  DULoxetine 60 milliGRAM(s) Oral daily  famotidine    Tablet 40 milliGRAM(s) Oral daily  finasteride 5 milliGRAM(s) Oral daily  furosemide    Tablet 20 milliGRAM(s) Oral daily  metoprolol succinate  milliGRAM(s) Oral daily  multivitamin/minerals 1 Tablet(s) Oral daily  nystatin Cream 1 Application(s) Topical two times a day  OXcarbazepine 150 milliGRAM(s) Oral two times a day  sacubitril 24 mG/valsartan 26 mG 1 Tablet(s) Oral two times a day  senna 1 Tablet(s) Oral at bedtime  sodium chloride 0.9% lock flush 3 milliLiter(s) IV Push every 8 hours  tamsulosin 0.4 milliGRAM(s) Oral at bedtime    MEDICATIONS  (PRN):  acetaminophen   Tablet .. 650 milliGRAM(s) Oral every 6 hours PRN Mild Pain (1 - 3)    LABS:                        11.2   5.78  )-----------( 227      ( 24 Aug 2019 10:31 )             36.3           PTT - ( 23 Aug 2019 09:44 )  PTT:32.8 sec    CAPILLARY BLOOD GLUCOSE              REVIEW OF SYSTEMS:  CONSTITUTIONAL: No fever, weight loss, or fatigue  EYES: No eye pain, visual disturbances, or discharge  ENMT:  No difficulty hearing, tinnitus, vertigo; No sinus or throat pain  NECK: No pain or stiffness  RESPIRATORY: No cough, wheezing, chills or hemoptysis; No shortness of breath  CARDIOVASCULAR: No chest pain, palpitations, dizziness, or leg swelling  GASTROINTESTINAL: No abdominal or epigastric pain. No nausea, vomiting, or hematemesis; No diarrhea or constipation. No melena or hematochezia.  GENITOURINARY: No dysuria, frequency, hematuria, or incontinence  Consultant(s) Notes Reviewed:  [x ] YES  [ ] NO    PHYSICAL EXAM:  GENERAL: NAD, well-groomed, well-developed,not in any distress ,  HEAD:  Atraumatic, Normocephalic  NECK: Supple, No JVD, Normal thyroid  NERVOUS SYSTEM:  Alert & Oriented X3,   CHEST/LUNG: Good air entry bilateral with no  rales, rhonchi, wheezing, or rubs  HEART: Regular rate and rhythm; No murmurs, rubs, or gallops  ABDOMEN: Soft, Nontender, Nondistended; Bowel sounds present  EXTREMITIES:   No clubbing, cyanosis, or edema  SKIN: decubitus ulcer +    Care Discussed with Consultants/Other Providers [ x] YES  [ ] NO

## 2019-08-24 NOTE — PROGRESS NOTE ADULT - SUBJECTIVE AND OBJECTIVE BOX
SUBJECTIVE / OVERNIGHT EVENTS:  Pt without any complaints, denies any chest pain, SOB, palpitations   He was normal sinus rhythm on telemetry with HR between 50-70, with 1st deg AV block.       MEDICATIONS  (STANDING):  MEDICATIONS  (STANDING):  apixaban 5 milliGRAM(s) Oral every 12 hours  ARIPiprazole 5 milliGRAM(s) Oral daily  ascorbic acid 500 milliGRAM(s) Oral daily  atorvastatin 80 milliGRAM(s) Oral at bedtime  buPROPion XL . 300 milliGRAM(s) Oral daily  clonazePAM  Tablet 2 milliGRAM(s) Oral at bedtime  clopidogrel Tablet 75 milliGRAM(s) Oral daily  docusate sodium 100 milliGRAM(s) Oral daily  DULoxetine 60 milliGRAM(s) Oral daily  famotidine    Tablet 40 milliGRAM(s) Oral daily  finasteride 5 milliGRAM(s) Oral daily  furosemide    Tablet 20 milliGRAM(s) Oral daily  metoprolol succinate  milliGRAM(s) Oral daily  multivitamin/minerals 1 Tablet(s) Oral daily  nystatin Cream 1 Application(s) Topical two times a day  OXcarbazepine 150 milliGRAM(s) Oral two times a day  sacubitril 24 mG/valsartan 26 mG 1 Tablet(s) Oral two times a day  senna 1 Tablet(s) Oral at bedtime  sodium chloride 0.9% lock flush 3 milliLiter(s) IV Push every 8 hours  tamsulosin 0.4 milliGRAM(s) Oral at bedtime        Vital Signs Last 24 Hrs  Vital Signs Last 24 Hrs  T(C): 36.6 (24 Aug 2019 08:40), Max: 37.6 (23 Aug 2019 20:16)  T(F): 97.9 (24 Aug 2019 08:40), Max: 99.7 (23 Aug 2019 20:16)  HR: 82 (24 Aug 2019 08:40) (64 - 86)  BP: 95/58 (24 Aug 2019 08:40) (95/58 - 125/70)  BP(mean): --  RR: 18 (24 Aug 2019 08:40) (18 - 18)  SpO2: 97% (24 Aug 2019 08:40) (96% - 98%)    I&O's Summary    23 Aug 2019 07:01  -  24 Aug 2019 07:00  --------------------------------------------------------  IN:    Oral Fluid: 480 mL  Total IN: 480 mL    OUT:    Indwelling Catheter - Urethral: 1750 mL  Total OUT: 1750 mL    Total NET: -1270 mL      PHYSICAL EXAM:  GENERAL: NAD, well-developed  HEAD:  Atraumatic, Normocephalic  EYES: EOMI, PERRLA, conjunctiva and sclera clear  NECK: Supple, No JVD  CHEST/LUNG: Clear to auscultation bilaterally; No wheeze  HEART: Regular rate and rhythm; No murmurs, rubs, or gallops  ABDOMEN: Soft, Nontender, Nondistended; Bowel sounds present  EXTREMITIES:  2+ Peripheral Pulses, No clubbing, cyanosis, or edema  PSYCH: AAOx3  NEUROLOGY: non-focal  SKIN: No rashes or lesions    LABS:                          11.5   5.05  )-----------( 235      ( 23 Aug 2019 09:42 )             37.1

## 2019-08-25 LAB
HCT VFR BLD CALC: 35.8 % — LOW (ref 39–50)
HGB BLD-MCNC: 10.8 G/DL — LOW (ref 13–17)
MCHC RBC-ENTMCNC: 26.9 PG — LOW (ref 27–34)
MCHC RBC-ENTMCNC: 30.2 GM/DL — LOW (ref 32–36)
MCV RBC AUTO: 89.3 FL — SIGNIFICANT CHANGE UP (ref 80–100)
PLATELET # BLD AUTO: 233 K/UL — SIGNIFICANT CHANGE UP (ref 150–400)
RBC # BLD: 4.01 M/UL — LOW (ref 4.2–5.8)
RBC # FLD: 18.2 % — HIGH (ref 10.3–14.5)
WBC # BLD: 5.91 K/UL — SIGNIFICANT CHANGE UP (ref 3.8–10.5)
WBC # FLD AUTO: 5.91 K/UL — SIGNIFICANT CHANGE UP (ref 3.8–10.5)

## 2019-08-25 PROCEDURE — 99233 SBSQ HOSP IP/OBS HIGH 50: CPT | Mod: GC

## 2019-08-25 RX ADMIN — ATORVASTATIN CALCIUM 80 MILLIGRAM(S): 80 TABLET, FILM COATED ORAL at 21:42

## 2019-08-25 RX ADMIN — CLOPIDOGREL BISULFATE 75 MILLIGRAM(S): 75 TABLET, FILM COATED ORAL at 11:11

## 2019-08-25 RX ADMIN — SACUBITRIL AND VALSARTAN 1 TABLET(S): 24; 26 TABLET, FILM COATED ORAL at 05:14

## 2019-08-25 RX ADMIN — Medication 20 MILLIGRAM(S): at 05:14

## 2019-08-25 RX ADMIN — BUPROPION HYDROCHLORIDE 300 MILLIGRAM(S): 150 TABLET, EXTENDED RELEASE ORAL at 11:11

## 2019-08-25 RX ADMIN — OXCARBAZEPINE 150 MILLIGRAM(S): 300 TABLET, FILM COATED ORAL at 05:14

## 2019-08-25 RX ADMIN — FINASTERIDE 5 MILLIGRAM(S): 5 TABLET, FILM COATED ORAL at 11:11

## 2019-08-25 RX ADMIN — TAMSULOSIN HYDROCHLORIDE 0.4 MILLIGRAM(S): 0.4 CAPSULE ORAL at 21:42

## 2019-08-25 RX ADMIN — APIXABAN 5 MILLIGRAM(S): 2.5 TABLET, FILM COATED ORAL at 05:14

## 2019-08-25 RX ADMIN — Medication 2 MILLIGRAM(S): at 21:42

## 2019-08-25 RX ADMIN — SACUBITRIL AND VALSARTAN 1 TABLET(S): 24; 26 TABLET, FILM COATED ORAL at 17:18

## 2019-08-25 RX ADMIN — Medication 1 TABLET(S): at 11:12

## 2019-08-25 RX ADMIN — Medication 500 MILLIGRAM(S): at 11:11

## 2019-08-25 RX ADMIN — ARIPIPRAZOLE 5 MILLIGRAM(S): 15 TABLET ORAL at 11:12

## 2019-08-25 RX ADMIN — SENNA PLUS 1 TABLET(S): 8.6 TABLET ORAL at 21:42

## 2019-08-25 RX ADMIN — NYSTATIN CREAM 1 APPLICATION(S): 100000 CREAM TOPICAL at 17:18

## 2019-08-25 RX ADMIN — OXCARBAZEPINE 150 MILLIGRAM(S): 300 TABLET, FILM COATED ORAL at 17:18

## 2019-08-25 RX ADMIN — FAMOTIDINE 40 MILLIGRAM(S): 10 INJECTION INTRAVENOUS at 11:11

## 2019-08-25 RX ADMIN — SODIUM CHLORIDE 3 MILLILITER(S): 9 INJECTION INTRAMUSCULAR; INTRAVENOUS; SUBCUTANEOUS at 13:39

## 2019-08-25 RX ADMIN — SODIUM CHLORIDE 3 MILLILITER(S): 9 INJECTION INTRAMUSCULAR; INTRAVENOUS; SUBCUTANEOUS at 21:41

## 2019-08-25 RX ADMIN — DULOXETINE HYDROCHLORIDE 60 MILLIGRAM(S): 30 CAPSULE, DELAYED RELEASE ORAL at 11:12

## 2019-08-25 RX ADMIN — NYSTATIN CREAM 1 APPLICATION(S): 100000 CREAM TOPICAL at 05:15

## 2019-08-25 RX ADMIN — APIXABAN 5 MILLIGRAM(S): 2.5 TABLET, FILM COATED ORAL at 17:18

## 2019-08-25 RX ADMIN — SODIUM CHLORIDE 3 MILLILITER(S): 9 INJECTION INTRAMUSCULAR; INTRAVENOUS; SUBCUTANEOUS at 05:13

## 2019-08-25 RX ADMIN — Medication 100 MILLIGRAM(S): at 05:14

## 2019-08-25 NOTE — PROGRESS NOTE ADULT - PROBLEM SELECTOR PROBLEM 2
Left ventricular mural thrombus
Coronary artery disease involving native coronary artery of native heart, angina presence unspecified
Coronary artery disease involving native coronary artery of native heart, angina presence unspecified
Left ventricular mural thrombus

## 2019-08-25 NOTE — PROGRESS NOTE ADULT - SUBJECTIVE AND OBJECTIVE BOX
NEPHROLOGY    Patient seen and examined.    MEDICATIONS  (STANDING):  apixaban 5 milliGRAM(s) Oral every 12 hours  ARIPiprazole 5 milliGRAM(s) Oral daily  ascorbic acid 500 milliGRAM(s) Oral daily  atorvastatin 80 milliGRAM(s) Oral at bedtime  buPROPion XL . 300 milliGRAM(s) Oral daily  clonazePAM  Tablet 2 milliGRAM(s) Oral at bedtime  clopidogrel Tablet 75 milliGRAM(s) Oral daily  docusate sodium 100 milliGRAM(s) Oral daily  DULoxetine 60 milliGRAM(s) Oral daily  famotidine    Tablet 40 milliGRAM(s) Oral daily  finasteride 5 milliGRAM(s) Oral daily  furosemide    Tablet 20 milliGRAM(s) Oral daily  metoprolol succinate  milliGRAM(s) Oral daily  multivitamin/minerals 1 Tablet(s) Oral daily  nystatin Cream 1 Application(s) Topical two times a day  OXcarbazepine 150 milliGRAM(s) Oral two times a day  sacubitril 24 mG/valsartan 26 mG 1 Tablet(s) Oral two times a day  senna 1 Tablet(s) Oral at bedtime  sodium chloride 0.9% lock flush 3 milliLiter(s) IV Push every 8 hours  tamsulosin 0.4 milliGRAM(s) Oral at bedtime    VITALS:  T(C): , Max: 36.7 (08-24-19 @ 12:20)  T(F): , Max: 98 (08-24-19 @ 12:20)  HR: 70 (08-25-19 @ 05:12)  BP: 109/71 (08-25-19 @ 05:12)  BP(mean): --  RR: 18 (08-25-19 @ 05:12)  SpO2: 96% (08-25-19 @ 05:12)  Wt(kg): --  I and O's:    08-24 @ 07:01  -  08-25 @ 07:00  --------------------------------------------------------  IN: 1340 mL / OUT: 2100 mL / NET: -760 mL    08-25 @ 07:01 - 08-25 @ 11:25  --------------------------------------------------------  IN: 290 mL / OUT: 0 mL / NET: 290 mL          REVIEW OF SYSTEMS:  Full ROS done and were negative unless otherwise indicated in HPI/assessment.     PHYSICAL EXAM:  Constitutional: NAD, Alert  HEENT: NCAT, DMM  Neck: Supple, No JVD  Respiratory: CTA-b/l  Cardiovascular: RRR s1s2, no m/r/g  Gastrointestinal: BS+, soft, NT/ND  Extremities: trace b/l LE edema  Neurological: (+)coarse RUE tremor; contracted right hand  Back: no CVAT b/l  Skin: (+)fibrotic changes b/l LE    LABS:                        10.8   5.91  )-----------( 233      ( 25 Aug 2019 08:43 )             35.8             Urine Studies:        RADIOLOGY & ADDITIONAL STUDIES:      ASSESSMENT  Patient is a 63M w/ HTN, DM2, CAD-CABG, CVA, and severe HFrEF, 8/16/19 from OSH s/p decompensated CHF and potentially for cardiac cath  Renal - mild azotemia/improved, Cardiac - s/p diagnostic cath and ready to be discharged today.      RECOMMEND:    -Continue home meds  -Can be discharged today from the nephrology standpoint  -Anticoagulation as per primary team  -Dose new meds for GFR 40-50ml/min        ESTELA Carbajal  [facility]  [phone]

## 2019-08-25 NOTE — PROGRESS NOTE ADULT - SUBJECTIVE AND OBJECTIVE BOX
SUBJECTIVE / OVERNIGHT EVENTS:  Pt without any complaints, denies any chest pain, SOB, palpitations   He was normal sinus rhythm on telemetry with HR between 60-70, with 1st deg AV block.     MEDICATIONS  (STANDING):  apixaban 5 milliGRAM(s) Oral every 12 hours  ARIPiprazole 5 milliGRAM(s) Oral daily  ascorbic acid 500 milliGRAM(s) Oral daily  atorvastatin 80 milliGRAM(s) Oral at bedtime  buPROPion XL . 300 milliGRAM(s) Oral daily  clonazePAM  Tablet 2 milliGRAM(s) Oral at bedtime  clopidogrel Tablet 75 milliGRAM(s) Oral daily  docusate sodium 100 milliGRAM(s) Oral daily  DULoxetine 60 milliGRAM(s) Oral daily  famotidine    Tablet 40 milliGRAM(s) Oral daily  finasteride 5 milliGRAM(s) Oral daily  furosemide    Tablet 20 milliGRAM(s) Oral daily  metoprolol succinate  milliGRAM(s) Oral daily  multivitamin/minerals 1 Tablet(s) Oral daily  nystatin Cream 1 Application(s) Topical two times a day  OXcarbazepine 150 milliGRAM(s) Oral two times a day  sacubitril 24 mG/valsartan 26 mG 1 Tablet(s) Oral two times a day  senna 1 Tablet(s) Oral at bedtime  sodium chloride 0.9% lock flush 3 milliLiter(s) IV Push every 8 hours  tamsulosin 0.4 milliGRAM(s) Oral at bedtime        Vital Signs Last 24 Hrs  Vital Signs Last 24 Hrs  T(C): 36.3 (25 Aug 2019 05:12), Max: 36.7 (24 Aug 2019 12:20)  T(F): 97.4 (25 Aug 2019 05:12), Max: 98 (24 Aug 2019 12:20)  HR: 70 (25 Aug 2019 05:12) (70 - 79)  BP: 109/71 (25 Aug 2019 05:12) (98/62 - 112/66)  BP(mean): --  RR: 18 (25 Aug 2019 05:12) (18 - 18)  SpO2: 96% (25 Aug 2019 05:12) (96% - 97%)    24 Aug 2019 07:01  -  25 Aug 2019 07:00  --------------------------------------------------------  IN:    Oral Fluid: 1340 mL  Total IN: 1340 mL    OUT:    Indwelling Catheter - Urethral: 2100 mL  Total OUT: 2100 mL    Total NET: -760 mL      25 Aug 2019 07:01  -  25 Aug 2019 11:43  --------------------------------------------------------  IN:    Oral Fluid: 290 mL  Total IN: 290 mL    OUT:  Total OUT: 0 mL    Total NET: 290 mL            PHYSICAL EXAM:  GENERAL: NAD, well-developed  HEAD:  Atraumatic, Normocephalic  EYES: EOMI, PERRLA, conjunctiva and sclera clear  NECK: Supple, No JVD  CHEST/LUNG: Clear to auscultation bilaterally; No wheeze  HEART: Regular rate and rhythm; No murmurs, rubs, or gallops  ABDOMEN: Soft, Nontender, Nondistended; Bowel sounds present  EXTREMITIES:  2+ Peripheral Pulses, No clubbing, cyanosis, or edema  PSYCH: AAOx3  NEUROLOGY: non-focal  SKIN: No rashes or lesions    LABS:                                     10.8   5.91  )-----------( 233      ( 25 Aug 2019 08:43 )             35.8

## 2019-08-25 NOTE — PROGRESS NOTE ADULT - PROBLEM SELECTOR PROBLEM 4
Essential hypertension
Urinary retention
Urinary retention
Essential hypertension
Essential hypertension

## 2019-08-25 NOTE — PROGRESS NOTE ADULT - PROBLEM SELECTOR PLAN 4
- normotensive  - continue with plan as above
- Unclear history for placement of Pro.  - Per family, plan for outpatient evaluation.  - Continue flomax and proscar
- Unclear history for placement of Pro.  - Per family, plan for outpatient evaluation.  - Continue flomax and proscar
- normotensive  - continue with plan as above

## 2019-08-25 NOTE — PROGRESS NOTE ADULT - PROBLEM SELECTOR PROBLEM 3
Coronary artery disease involving native coronary artery of native heart, angina presence unspecified
Essential hypertension
Essential hypertension
Coronary artery disease involving native coronary artery of native heart, angina presence unspecified
Coronary artery disease involving native coronary artery of native heart, angina presence unspecified

## 2019-08-25 NOTE — PROGRESS NOTE ADULT - PROBLEM SELECTOR PLAN 5
- Unclear history for placement of Pro.  - Per family, plan for outpatient evaluation.  - Continue flomax and proscar

## 2019-08-25 NOTE — PROGRESS NOTE ADULT - PROBLEM SELECTOR PROBLEM 1
Acute on chronic systolic heart failure

## 2019-08-25 NOTE — PROGRESS NOTE ADULT - SUBJECTIVE AND OBJECTIVE BOX
INTERVAL HPI/OVERNIGHT EVENTS: I feel fine. Going home tomorrow.   Vital Signs Last 24 Hrs  T(C): 36.3 (25 Aug 2019 05:12), Max: 36.7 (24 Aug 2019 12:20)  T(F): 97.4 (25 Aug 2019 05:12), Max: 98 (24 Aug 2019 12:20)  HR: 70 (25 Aug 2019 05:12) (70 - 79)  BP: 109/71 (25 Aug 2019 05:12) (98/62 - 112/66)  BP(mean): --  RR: 18 (25 Aug 2019 05:12) (18 - 18)  SpO2: 96% (25 Aug 2019 05:12) (96% - 97%)  I&O's Summary    24 Aug 2019 07:01  -  25 Aug 2019 07:00  --------------------------------------------------------  IN: 1340 mL / OUT: 2100 mL / NET: -760 mL    25 Aug 2019 07:01  -  25 Aug 2019 12:09  --------------------------------------------------------  IN: 290 mL / OUT: 0 mL / NET: 290 mL      MEDICATIONS  (STANDING):  apixaban 5 milliGRAM(s) Oral every 12 hours  ARIPiprazole 5 milliGRAM(s) Oral daily  ascorbic acid 500 milliGRAM(s) Oral daily  atorvastatin 80 milliGRAM(s) Oral at bedtime  buPROPion XL . 300 milliGRAM(s) Oral daily  clonazePAM  Tablet 2 milliGRAM(s) Oral at bedtime  clopidogrel Tablet 75 milliGRAM(s) Oral daily  docusate sodium 100 milliGRAM(s) Oral daily  DULoxetine 60 milliGRAM(s) Oral daily  famotidine    Tablet 40 milliGRAM(s) Oral daily  finasteride 5 milliGRAM(s) Oral daily  furosemide    Tablet 20 milliGRAM(s) Oral daily  metoprolol succinate  milliGRAM(s) Oral daily  multivitamin/minerals 1 Tablet(s) Oral daily  nystatin Cream 1 Application(s) Topical two times a day  OXcarbazepine 150 milliGRAM(s) Oral two times a day  sacubitril 24 mG/valsartan 26 mG 1 Tablet(s) Oral two times a day  senna 1 Tablet(s) Oral at bedtime  sodium chloride 0.9% lock flush 3 milliLiter(s) IV Push every 8 hours  tamsulosin 0.4 milliGRAM(s) Oral at bedtime    MEDICATIONS  (PRN):  acetaminophen   Tablet .. 650 milliGRAM(s) Oral every 6 hours PRN Mild Pain (1 - 3)    LABS:                        10.8   5.91  )-----------( 233      ( 25 Aug 2019 08:43 )             35.8               CAPILLARY BLOOD GLUCOSE              REVIEW OF SYSTEMS:  CONSTITUTIONAL: No fever, weight loss, or fatigue  EYES: No eye pain, visual disturbances, or discharge  ENMT:  No difficulty hearing, tinnitus, vertigo; No sinus or throat pain  NECK: No pain or stiffness  RESPIRATORY: No cough, wheezing, chills or hemoptysis; No shortness of breath  CARDIOVASCULAR: No chest pain, palpitations, dizziness, or leg swelling  GASTROINTESTINAL: No abdominal or epigastric pain. No nausea, vomiting, or hematemesis; No diarrhea or constipation. No melena or hematochezia.  GENITOURINARY: No dysuria, frequency, hematuria, or incontinence  NEUROLOGICAL: No headaches, memory loss, loss of strength, numbness, or tremors    Consultant(s) Notes Reviewed:  [x ] YES  [ ] NO    PHYSICAL EXAM:  GENERAL: NAD, well-groomed, well-developed,not in any distress ,  HEAD:  Atraumatic, Normocephalic  EYES: EOMI, PERRLA, conjunctiva and sclera clear  NECK: Supple, No JVD, Normal thyroid  NERVOUS SYSTEM:  Alert & Oriented X3, No focal deficit   CHEST/LUNG: Good air entry bilateral with no  rales, rhonchi, wheezing, or rubs  HEART: Regular rate and rhythm; No murmurs, rubs, or gallops  ABDOMEN: Soft, Nontender, Nondistended; Bowel sounds present  EXTREMITIES:   No clubbing, cyanosis, or edema.  Sacral decubitus +    Care Discussed with Consultants/Other Providers [ x] YES  [ ] NO

## 2019-08-25 NOTE — PROGRESS NOTE ADULT - PROBLEM SELECTOR PLAN 1
- Stable and well compensated.  - continue lasix 20 mg daily   - continue entresto 24-26 mg BID  - A follow up appointment with the Heart Failure Clinic has been scheduled for 9/9/19 at 10:00 am at [facility]. Office Number is [phone].  - follow up as an outpatient with Dr. Padron.

## 2019-08-26 ENCOUNTER — INBOUND DOCUMENT (OUTPATIENT)
Age: 64
End: 2019-08-26

## 2019-08-26 ENCOUNTER — TRANSCRIPTION ENCOUNTER (OUTPATIENT)
Age: 64
End: 2019-08-26

## 2019-08-26 VITALS
TEMPERATURE: 100 F | RESPIRATION RATE: 18 BRPM | HEART RATE: 83 BPM | OXYGEN SATURATION: 100 % | SYSTOLIC BLOOD PRESSURE: 120 MMHG | DIASTOLIC BLOOD PRESSURE: 63 MMHG

## 2019-08-26 PROBLEM — I50.9 HEART FAILURE, UNSPECIFIED: Chronic | Status: ACTIVE | Noted: 2019-08-16

## 2019-08-26 PROBLEM — N40.0 BENIGN PROSTATIC HYPERPLASIA WITHOUT LOWER URINARY TRACT SYMPTOMS: Chronic | Status: ACTIVE | Noted: 2019-08-16

## 2019-08-26 PROBLEM — L89.153 PRESSURE ULCER OF SACRAL REGION, STAGE 3: Chronic | Status: ACTIVE | Noted: 2019-08-16

## 2019-08-26 PROCEDURE — 83036 HEMOGLOBIN GLYCOSYLATED A1C: CPT

## 2019-08-26 PROCEDURE — 97530 THERAPEUTIC ACTIVITIES: CPT

## 2019-08-26 PROCEDURE — 93005 ELECTROCARDIOGRAM TRACING: CPT

## 2019-08-26 PROCEDURE — 99153 MOD SED SAME PHYS/QHP EA: CPT

## 2019-08-26 PROCEDURE — 83735 ASSAY OF MAGNESIUM: CPT

## 2019-08-26 PROCEDURE — 83880 ASSAY OF NATRIURETIC PEPTIDE: CPT

## 2019-08-26 PROCEDURE — C8929: CPT

## 2019-08-26 PROCEDURE — 85610 PROTHROMBIN TIME: CPT

## 2019-08-26 PROCEDURE — 85730 THROMBOPLASTIN TIME PARTIAL: CPT

## 2019-08-26 PROCEDURE — 85027 COMPLETE CBC AUTOMATED: CPT

## 2019-08-26 PROCEDURE — C1887: CPT

## 2019-08-26 PROCEDURE — 82962 GLUCOSE BLOOD TEST: CPT

## 2019-08-26 PROCEDURE — C1760: CPT

## 2019-08-26 PROCEDURE — 80048 BASIC METABOLIC PNL TOTAL CA: CPT

## 2019-08-26 PROCEDURE — 93457 R HRT ART/GRFT ANGIO: CPT

## 2019-08-26 PROCEDURE — 71045 X-RAY EXAM CHEST 1 VIEW: CPT

## 2019-08-26 PROCEDURE — C1894: CPT

## 2019-08-26 PROCEDURE — 97162 PT EVAL MOD COMPLEX 30 MIN: CPT

## 2019-08-26 PROCEDURE — C1769: CPT

## 2019-08-26 PROCEDURE — 97602 WOUND(S) CARE NON-SELECTIVE: CPT

## 2019-08-26 PROCEDURE — 97116 GAIT TRAINING THERAPY: CPT

## 2019-08-26 PROCEDURE — 99152 MOD SED SAME PHYS/QHP 5/>YRS: CPT

## 2019-08-26 RX ADMIN — APIXABAN 5 MILLIGRAM(S): 2.5 TABLET, FILM COATED ORAL at 05:18

## 2019-08-26 RX ADMIN — BUPROPION HYDROCHLORIDE 300 MILLIGRAM(S): 150 TABLET, EXTENDED RELEASE ORAL at 11:44

## 2019-08-26 RX ADMIN — Medication 500 MILLIGRAM(S): at 11:43

## 2019-08-26 RX ADMIN — DULOXETINE HYDROCHLORIDE 60 MILLIGRAM(S): 30 CAPSULE, DELAYED RELEASE ORAL at 11:44

## 2019-08-26 RX ADMIN — ARIPIPRAZOLE 5 MILLIGRAM(S): 15 TABLET ORAL at 11:44

## 2019-08-26 RX ADMIN — Medication 100 MILLIGRAM(S): at 05:18

## 2019-08-26 RX ADMIN — FINASTERIDE 5 MILLIGRAM(S): 5 TABLET, FILM COATED ORAL at 11:44

## 2019-08-26 RX ADMIN — Medication 1 TABLET(S): at 11:43

## 2019-08-26 RX ADMIN — Medication 20 MILLIGRAM(S): at 05:18

## 2019-08-26 RX ADMIN — Medication 100 MILLIGRAM(S): at 11:43

## 2019-08-26 RX ADMIN — SODIUM CHLORIDE 3 MILLILITER(S): 9 INJECTION INTRAMUSCULAR; INTRAVENOUS; SUBCUTANEOUS at 05:19

## 2019-08-26 RX ADMIN — CLOPIDOGREL BISULFATE 75 MILLIGRAM(S): 75 TABLET, FILM COATED ORAL at 11:44

## 2019-08-26 RX ADMIN — SACUBITRIL AND VALSARTAN 1 TABLET(S): 24; 26 TABLET, FILM COATED ORAL at 05:18

## 2019-08-26 RX ADMIN — NYSTATIN CREAM 1 APPLICATION(S): 100000 CREAM TOPICAL at 05:18

## 2019-08-26 RX ADMIN — OXCARBAZEPINE 150 MILLIGRAM(S): 300 TABLET, FILM COATED ORAL at 05:18

## 2019-08-26 RX ADMIN — FAMOTIDINE 40 MILLIGRAM(S): 10 INJECTION INTRAVENOUS at 11:44

## 2019-08-26 NOTE — PROGRESS NOTE ADULT - SUBJECTIVE AND OBJECTIVE BOX
No pain, no shortness of breath      VITAL:  T(C): , Max: 37.1 (08-25-19 @ 20:37)  T(F): , Max: 98.7 (08-25-19 @ 20:37)  HR: 68 (08-26-19 @ 07:55)  BP: 98/63 (08-26-19 @ 07:55)  RR: 18 (08-26-19 @ 07:55)  SpO2: 98% (08-26-19 @ 07:55)      PHYSICAL EXAM:  Constitutional: NAD, Alert  HEENT: NCAT, DMM  Neck: Supple, No JVD  Respiratory: CTA-b/l  Cardiovascular: RRR s1s2, no m/r/g  Gastrointestinal: BS+, soft, NT/ND  Extremities: trace b/l LE edema  Neurological: (+)coarse RUE tremor; contracted right hand  Back: no CVAT b/l  Skin: (+)fibrotic changes b/l LE      IMPRESSION: 63M w/ HTN, DM2, CAD-CABG, CVA, and severe HFrEF, 8/16/19 from OSH s/p decompensated CHF   (1)Renal - last BMP from 8/22/19  (2)LV thrombus - now on heparin gtt  (3)Cardiac - s/p diagnostic cath 8/22 - no plan for PCI    RECOMMEND:  (1)Entresto and Lasix as ordered  (2)D/C planning per primary team - would f/u BMP within 1 week of discharge, as outpatient            Eriberto Fowler MD  [facility]  [phone] No pain, no shortness of breath      VITAL:  T(C): , Max: 37.1 (08-25-19 @ 20:37)  T(F): , Max: 98.7 (08-25-19 @ 20:37)  HR: 68 (08-26-19 @ 07:55)  BP: 98/63 (08-26-19 @ 07:55)  RR: 18 (08-26-19 @ 07:55)  SpO2: 98% (08-26-19 @ 07:55)      PHYSICAL EXAM:  Constitutional: NAD, Alert  HEENT: NCAT, DMM  Neck: Supple, No JVD  Respiratory: CTA-b/l  Cardiovascular: RRR s1s2, no m/r/g  Gastrointestinal: BS+, soft, NT/ND  Extremities: trace b/l LE edema  Neurological: (+)coarse RUE tremor; contracted right hand  Back: no CVAT b/l  Skin: (+)fibrotic changes b/l LE      IMPRESSION: 63M w/ HTN, DM2, CAD-CABG, CVA, and severe HFrEF, 8/16/19 from OSH s/p decompensated CHF   (1)Renal - last BMP from 8/22/19  (2)LV thrombus - now on heparin gtt  (3)Cardiac - s/p diagnostic cath 8/22 - no plan for PCI    RECOMMEND:  (1)Entresto and Lasix as ordered  (2)D/C planning per primary team - would f/u BMP within 1 week of discharge, as outpatient        Eriberto Fowler MD  [facility]  [phone]

## 2019-08-26 NOTE — DISCHARGE NOTE NURSING/CASE MANAGEMENT/SOCIAL WORK - [id number]_GEN_ALL_CORE
Call 911 for stroke/Prescribed medications/Stroke support groups for patients, families, and friends/Need for follow up after discharge/Risk factors for stroke/Stroke education booklet/Stroke warning signs and symptoms/Signs and symptoms of stroke

## 2019-08-26 NOTE — PROGRESS NOTE ADULT - SUBJECTIVE AND OBJECTIVE BOX
INTERVAL HPI/OVERNIGHT EVENTS: I am happy and feel better that I am going home today.   Vital Signs Last 24 Hrs  T(C): 36.6 (26 Aug 2019 07:55), Max: 37.1 (25 Aug 2019 20:37)  T(F): 97.9 (26 Aug 2019 07:55), Max: 98.7 (25 Aug 2019 20:37)  HR: 68 (26 Aug 2019 07:55) (64 - 89)  BP: 98/63 (26 Aug 2019 07:55) (94/60 - 102/67)  BP(mean): --  RR: 18 (26 Aug 2019 07:55) (17 - 18)  SpO2: 98% (26 Aug 2019 07:55) (95% - 98%)  I&O's Summary    25 Aug 2019 07:01  -  26 Aug 2019 07:00  --------------------------------------------------------  IN: 1030 mL / OUT: 1300 mL / NET: -270 mL      MEDICATIONS  (STANDING):  apixaban 5 milliGRAM(s) Oral every 12 hours  ARIPiprazole 5 milliGRAM(s) Oral daily  ascorbic acid 500 milliGRAM(s) Oral daily  atorvastatin 80 milliGRAM(s) Oral at bedtime  buPROPion XL . 300 milliGRAM(s) Oral daily  clonazePAM  Tablet 2 milliGRAM(s) Oral at bedtime  clopidogrel Tablet 75 milliGRAM(s) Oral daily  docusate sodium 100 milliGRAM(s) Oral daily  DULoxetine 60 milliGRAM(s) Oral daily  famotidine    Tablet 40 milliGRAM(s) Oral daily  finasteride 5 milliGRAM(s) Oral daily  furosemide    Tablet 20 milliGRAM(s) Oral daily  metoprolol succinate  milliGRAM(s) Oral daily  multivitamin/minerals 1 Tablet(s) Oral daily  nystatin Cream 1 Application(s) Topical two times a day  OXcarbazepine 150 milliGRAM(s) Oral two times a day  sacubitril 24 mG/valsartan 26 mG 1 Tablet(s) Oral two times a day  senna 1 Tablet(s) Oral at bedtime  sodium chloride 0.9% lock flush 3 milliLiter(s) IV Push every 8 hours  tamsulosin 0.4 milliGRAM(s) Oral at bedtime    MEDICATIONS  (PRN):  acetaminophen   Tablet .. 650 milliGRAM(s) Oral every 6 hours PRN Mild Pain (1 - 3)    LABS:                        10.8   5.91  )-----------( 233      ( 25 Aug 2019 08:43 )             35.8               CAPILLARY BLOOD GLUCOSE              REVIEW OF SYSTEMS:  CONSTITUTIONAL: No fever, weight loss, or fatigue  EYES: No eye pain, visual disturbances, or discharge  ENMT:  No difficulty hearing, tinnitus, vertigo; No sinus or throat pain  NECK: No pain or stiffness  RESPIRATORY: No cough, wheezing, chills or hemoptysis; No shortness of breath  CARDIOVASCULAR: No chest pain, palpitations, dizziness, or leg swelling  GASTROINTESTINAL: No abdominal or epigastric pain. No nausea, vomiting, or hematemesis; No diarrhea or constipation. No melena or hematochezia.  GENITOURINARY: No dysuria, frequency, hematuria, or incontinence  NEUROLOGICAL: No headaches, memory loss, loss of strength, numbness, or tremors    Consultant(s) Notes Reviewed:  [x ] YES  [ ] NO    PHYSICAL EXAM:  GENERAL: NAD, well-groomed, well-developed,not in any distress ,  HEAD:  Atraumatic, Normocephalic  EYES: EOMI, PERRLA, conjunctiva and sclera clear  ENMT: No tonsillar erythema, exudates, or enlargement; Moist mucous membranes, Good dentition, No lesions  NECK: Supple, No JVD, Normal thyroid  NERVOUS SYSTEM:  Alert & Oriented X3,   CHEST/LUNG: Good air entry bilateral with no  rales, rhonchi, wheezing, or rubs  HEART: Regular rate and rhythm; No murmurs, rubs, or gallops  ABDOMEN: Soft, Nontender, Nondistended; Bowel sounds present  EXTREMITIES:  2+ Peripheral Pulses, No clubbing, cyanosis, or edema  SKIN: sacral decubitus +    Care Discussed with Consultants/Other Providers [ x] YES  [ ] NO

## 2019-08-26 NOTE — PROGRESS NOTE ADULT - ASSESSMENT
This is a  64 yo M with ICM, LVEF 10-15%, and severe LV enlargement on recent nuclear stress test who was admitted for cardiac cath. The HF service was consulted to provide medical optimization prior to considering an intervention. He has significant functional limitation due to prior stroke and residual weakness. At this time he appears well compensated from systolic heart failure.
63 year old  male no implantable device with PMHx: HTN, DMT2 (not on meds, last A1C in 2016 was 5.6), CAD, MI followed by CABG X 4 (2007), Stent x 2 in 2011, CVA with right side weakness and tremors, HTN, HLD, stage III- IV sacral decubitus presented to [facility] on 8/8/2019 c/o SOB, no chest pain, no dizziness or syncopal episode. CXR showed pulm congestion with prBNP 10k, WBC 18, was placed on BIPAP admitted to CCU for CHF exacerbation diuresed, and now on 3L/NC. Had mild elevation of Troponin 0.38 from 0.36. On admission PE, pt had bilateral crackles with JVD as per record. For possible pnemonia & UTI (positive for 3 organism), a course of ABX (Rocephin and Zithromax) completed on 8/15. EF 10% on this admission compare to last EF 14% by stress test on 8/5/2019 at [facility].     Problem/Recommendation - 1:  Problem: Acute on chronic systolic congestive heart failure with Cardiomyopathy with low EF. Recommendation: Heart Failure team following. Management per them. TTE pending.      Problem/Recommendation - 2:  ·  Problem: Sacral decubitus ulcer, stage III.  Recommendation: Wound care following and plastics consult noted.       Problem/Recommendation - 3:  ·  Problem: Hypertension.  Recommendation: Bp meds with hold parameters. Bp readings fine.      Problem/Recommendation - 4:  ·  Problem: Depression.  Recommendation: home meds.      Problem/Recommendation - 5:  ·  Problem: Hypercholesteremia.  Recommendation: statin.      Problem/Recommendation - 6:  Problem: CVA (cerebral vascular accident). Recommendation: supportive care. Has HHA.     Problem/Recommendation - 7:  ·  Problem: ILANA .  Recommendation: Renal helping.     Disposition : Dc planning .
63 year old  male no implantable device with PMHx: HTN, DMT2 (not on meds, last A1C in 2016 was 5.6), CAD, MI followed by CABG X 4 (2007), Stent x 2 in 2011, CVA with right side weakness and tremors, HTN, HLD, stage III- IV sacral decubitus presented to [facility] on 8/8/2019 c/o SOB, no chest pain, no dizziness or syncopal episode. CXR showed pulm congestion with prBNP 10k, WBC 18, was placed on BIPAP admitted to CCU for CHF exacerbation diuresed, and now on 3L/NC. Had mild elevation of Troponin 0.38 from 0.36. On admission PE, pt had bilateral crackles with JVD as per record. For possible pnemonia & UTI (positive for 3 organism), a course of ABX (Rocephin and Zithromax) completed on 8/15. EF 10% on this admission compare to last EF 14% by stress test on 8/5/2019 at [facility].     Problem/Recommendation - 1:  Problem: Acute on chronic systolic congestive heart failure with Cardiomyopathy with low EF. Recommendation: Heart Failure team following. Management per them. S/P cardiac cath.   TTE noted. < from: TTE with Doppler (w/Cont) (08.20.19 @ 15:15) >  Conclusions:  1. Eccentric left ventricular hypertrophy (dilated left  ventricle with normal relative wall thickness).  2. Endocardial visualization enhanced with intravenous  injection of Ultrasonic Enhancing Agent (Definity). Severe  global left ventricular systolic dysfunction with regional  variation. There appears to be the suggestion of a small  left ventricular apical, mural thrombus. Consider CT or MRI  to confirm finding. Notified JENNIFER Ashton at 7:00 PM  8/20/2019.  3.Normal right ventricular size and function.    < end of copied text >       Problem/Recommendation - 2:  ·  Problem: Sacral decubitus ulcer, stage III.  Recommendation: Wound care following and plastics consult noted.       Problem/Recommendation - 3:  ·  Problem: Hypertension.  Recommendation: Bp meds with hold parameters. Bp readings fine.      Problem/Recommendation - 4:  ·  Problem: Depression.  Recommendation: Home meds.      Problem/Recommendation - 5:  ·  Problem: Hypercholesteremia.  Recommendation: statin.      Problem/Recommendation - 6:  Problem: CVA (cerebral vascular accident). Recommendation: supportive care. Has HHA.     Problem/Recommendation - 7:  ·  Problem: ILANA .  Recommendation: Renal helping.      Problem/Recommendation - 8:  ·  Problem: LV Thrombus  .  Recommendation: Heparin changed to PO Eliquis per Cardiology.     Disposition : Dc planning home as Medically stable and cleared by cardiology. Oupt follow up.
63 year old  male no implantable device with PMHx: HTN, DMT2 (not on meds, last A1C in 2016 was 5.6), CAD, MI followed by CABG X 4 (2007), Stent x 2 in 2011, CVA with right side weakness and tremors, HTN, HLD, stage III- IV sacral decubitus presented to [facility] on 8/8/2019 c/o SOB, no chest pain, no dizziness or syncopal episode. CXR showed pulm congestion with prBNP 10k, WBC 18, was placed on BIPAP admitted to CCU for CHF exacerbation diuresed, and now on 3L/NC. Had mild elevation of Troponin 0.38 from 0.36. On admission PE, pt had bilateral crackles with JVD as per record. For possible pnemonia & UTI (positive for 3 organism), a course of ABX (Rocephin and Zithromax) completed on 8/15. EF 10% on this admission compare to last EF 14% by stress test on 8/5/2019 at [facility].     Problem/Recommendation - 1:  Problem: Acute on chronic systolic congestive heart failure with Cardiomyopathy with low EF. Recommendation: Heart Failure team following. Management per them. S/P cardiac cath.   TTE noted. < from: TTE with Doppler (w/Cont) (08.20.19 @ 15:15) >  Conclusions:  1. Eccentric left ventricular hypertrophy (dilated left  ventricle with normal relative wall thickness).  2. Endocardial visualization enhanced with intravenous  injection of Ultrasonic Enhancing Agent (Definity). Severe  global left ventricular systolic dysfunction with regional  variation. There appears to be the suggestion of a small  left ventricular apical, mural thrombus. Consider CT or MRI  to confirm finding. Notified JENNIFER Ashton at 7:00 PM  8/20/2019.  3.Normal right ventricular size and function.    < end of copied text >       Problem/Recommendation - 2:  ·  Problem: Sacral decubitus ulcer, stage III.  Recommendation: Wound care following and plastics consult noted.       Problem/Recommendation - 3:  ·  Problem: Hypertension.  Recommendation: Bp meds with hold parameters. Bp readings fine.      Problem/Recommendation - 4:  ·  Problem: Depression.  Recommendation: Home meds.      Problem/Recommendation - 5:  ·  Problem: Hypercholesteremia.  Recommendation: statin.      Problem/Recommendation - 6:  Problem: CVA (cerebral vascular accident). Recommendation: supportive care. Has HHA.     Problem/Recommendation - 7:  ·  Problem: ILANA .  Recommendation: Renal helping.      Problem/Recommendation - 8:  ·  Problem: LV Thrombus  .  Recommendation: Heparin changed to PO Eliquis per Cardiology.     Disposition : Dc planning home since yesterday as Medically stable and cleared by cardiology. Oupt follow up.
63 year old  male no implantable device with PMHx: HTN, DMT2 (not on meds, last A1C in 2016 was 5.6), CAD, MI followed by CABG X 4 (2007), Stent x 2 in 2011, CVA with right side weakness and tremors, HTN, HLD, stage III- IV sacral decubitus [facility] c/o SOB, no chest pain, no dizziness or syncopal episode. CXR showed pulm congestion with prBNP 10k, WBC 18, was placed on BIPAP admitted to CCU for CHF exacerbation diuresed, and now on 3L/NC. Had mild elevation of Troponin 0.38 from 0.36. On admission PE, pt had bilateral crackles with JVD as per record. For possible pnemonia & UTI (positive for 3 organism), a course of ABX (Rocephin and Zithromax) completed on 8/15. EF 10% on this admission compare to last EF 14% by stress test on 8/5/2019 at [facility].     Problem/Recommendation - 1:  Problem: Acute on chronic systolic congestive heart failure with Cardiomyopathy with low EF. Recommendation: Heart Failure team following. Management per them. S/P cardiac cath.   TTE noted. < from: TTE with Doppler (w/Cont) (08.20.19 @ 15:15) >  Conclusions:  1. Eccentric left ventricular hypertrophy (dilated left  ventricle with normal relative wall thickness).  2. Endocardial visualization enhanced with intravenous  injection of Ultrasonic Enhancing Agent (Definity). Severe  global left ventricular systolic dysfunction with regional  variation. There appears to be the suggestion of a small  left ventricular apical, mural thrombus. Consider CT or MRI  to confirm finding. Notified JENNIFER Ashton at 7:00 PM  8/20/2019.  3.Normal right ventricular size and function.    < end of copied text >       Problem/Recommendation - 2:  ·  Problem: Sacral decubitus ulcer, stage III.  Recommendation: Wound care following and plastics consult noted.       Problem/Recommendation - 3:  ·  Problem: Hypertension.  Recommendation: Bp meds with hold parameters. Bp readings fine.      Problem/Recommendation - 4:  ·  Problem: Depression.  Recommendation: Home meds.      Problem/Recommendation - 5:  ·  Problem: Hypercholesteremia.  Recommendation: statin.      Problem/Recommendation - 6:  Problem: CVA (cerebral vascular accident). Recommendation: supportive care. Has HHA.     Problem/Recommendation - 7:  ·  Problem: ILANA .  Recommendation: Renal helping.      Problem/Recommendation - 8:  ·  Problem: LV Thrombus  .  Recommendation: Heparin changed to PO Eliquis per Cardiology.     Disposition : Dc planning home today to F/U HF team and Dr Padron. D/W Attending Dr Jasmin Saunders.
63 year old  male no implantable device with PMHx: HTN, DMT2 (not on meds, last A1C in 2016 was 5.6), CAD, MI followed by CABG X 4 (2007), Stent x 2 in 2011, CVA with right side weakness and tremors, HTN, HLD, stage III- IV sacral decubitus presented [facility] on 8/8/2019 c/o SOB, no chest pain, no dizziness or syncopal episode. CXR showed pulm congestion with prBNP 10k, WBC 18, was placed on BIPAP admitted to CCU for CHF exacerbation diuresed, and now on 3L/NC. Had mild elevation of Troponin 0.38 from 0.36. On admission PE, pt had bilateral crackles with JVD as per record. For possible pnemonia & UTI (positive for 3 organism), a course of ABX (Rocephin and Zithromax) completed on 8/15. EF 10% on this admission compare to last EF 14% by stress test on 8/5/2019 at [facility].     Problem/Recommendation - 1:  Problem: Acute on chronic systolic congestive heart failure with Cardiomyopathy with low EF. Recommendation: Heart Failure team following. Management per them. Awaiting cardiac cath.   TTE noted. < from: TTE with Doppler (w/Cont) (08.20.19 @ 15:15) >  Conclusions:  1. Eccentric left ventricular hypertrophy (dilated left  ventricle with normal relative wall thickness).  2. Endocardial visualization enhanced with intravenous  injection of Ultrasonic Enhancing Agent (Definity). Severe  global left ventricular systolic dysfunction with regional  variation. There appears to be the suggestion of a small  left ventricular apical, mural thrombus. Consider CT or MRI  to confirm finding. Notified JENNIFER Ashton at 7:00 PM  8/20/2019.  3.Normal right ventricular size and function.    < end of copied text >       Problem/Recommendation - 2:  ·  Problem: Sacral decubitus ulcer, stage III.  Recommendation: Wound care following and plastics consult noted.       Problem/Recommendation - 3:  ·  Problem: Hypertension.  Recommendation: Bp meds with hold parameters. Bp readings fine.      Problem/Recommendation - 4:  ·  Problem: Depression.  Recommendation: Home meds.      Problem/Recommendation - 5:  ·  Problem: Hypercholesteremia.  Recommendation: statin.      Problem/Recommendation - 6:  Problem: CVA (cerebral vascular accident). Recommendation: supportive care. Has HHA.     Problem/Recommendation - 7:  ·  Problem: ILANA .  Recommendation: Renal helping.      Problem/Recommendation - 8:  ·  Problem: LV Thrombus  .  Recommendation: Heparin for now.     Disposition : Dc planning pending cardiac cath.
63 year old  male no implantable device with PMHx: HTN, DMT2 (not on meds, last A1C in 2016 was 5.6), CAD, MI followed by CABG X 4 (2007), Stent x 2 in 2011, CVA with right side weakness and tremors, HTN, HLD, stage III- IV sacral decubitus presented to [facility] on 8/8/2019 c/o SOB, no chest pain, no dizziness or syncopal episode. CXR showed pulm congestion with prBNP 10k, WBC 18, was placed on BIPAP admitted to CCU for CHF exacerbation diuresed, and now on 3L/NC. Had mild elevation of Troponin 0.38 from 0.36. On admission PE, pt had bilateral crackles with JVD as per record. For possible pnemonia & UTI (positive for 3 organism), a course of ABX (Rocephin and Zithromax) completed on 8/15. EF 10% on this admission compare to last EF 14% by stress test on 8/5/2019 at [facility].     Problem/Recommendation - 1:  Problem: Acute on chronic systolic congestive heart failure with Cardiomyopathy with low EF. Recommendation: Heart Failure team following. Management per them.   TTE noted. < from: TTE with Doppler (w/Cont) (08.20.19 @ 15:15) >  Conclusions:  1. Eccentric left ventricular hypertrophy (dilated left  ventricle with normal relative wall thickness).  2. Endocardial visualization enhanced with intravenous  injection of Ultrasonic Enhancing Agent (Definity). Severe  global left ventricular systolic dysfunction with regional  variation. There appears to be the suggestion of a small  left ventricular apical, mural thrombus. Consider CT or MRI  to confirm finding. Notified JENNIFER Ashton at 7:00 PM  8/20/2019.  3.Normal right ventricular size and function.    < end of copied text >       Problem/Recommendation - 2:  ·  Problem: Sacral decubitus ulcer, stage III.  Recommendation: Wound care following and plastics consult noted.       Problem/Recommendation - 3:  ·  Problem: Hypertension.  Recommendation: Bp meds with hold parameters. Bp readings fine.      Problem/Recommendation - 4:  ·  Problem: Depression.  Recommendation: home meds.      Problem/Recommendation - 5:  ·  Problem: Hypercholesteremia.  Recommendation: statin.      Problem/Recommendation - 6:  Problem: CVA (cerebral vascular accident). Recommendation: supportive care. Has HHA.     Problem/Recommendation - 7:  ·  Problem: ILANA .  Recommendation: Renal helping.      Problem/Recommendation - 8:  ·  Problem: LV Thrombus  .  Recommendation: Heparin for now.     Disposition : Dc planning once INR therapeutic .
63 year old  male no implantable device with PMHx: HTN, DMT2 (not on meds, last A1C in 2016 was 5.6), CAD, MI followed by CABG X 4 (2007), Stent x 2 in 2011, CVA with right side weakness and tremors, HTN, HLD, stage III- IV sacral decubitus presented to [facility] on 8/8/2019 c/o SOB, no chest pain, no dizziness or syncopal episode. CXR showed pulm congestion with prBNP 10k, WBC 18, was placed on BIPAP admitted to CCU for CHF exacerbation diuresed, and now on 3L/NC. Had mild elevation of Troponin 0.38 from 0.36. On admission PE, pt had bilateral crackles with JVD as per record. For possible pnemonia & UTI (positive for 3 organism), a course of ABX (Rocephin and Zithromax) completed on 8/15. EF 10% on this admission compare to last EF 14% by stress test on 8/5/2019 at [facility].     Problem/Recommendation - 1:  Problem: Acute on chronic systolic congestive heart failure with Cardiomyopathy with low EF. Recommendation: Heart Failure team following. Management per them.     Problem/Recommendation - 2:  ·  Problem: Sacral decubitus ulcer, stage III.  Recommendation: Wound care following and plastics consulting.      Problem/Recommendation - 3:  ·  Problem: Hypertension.  Recommendation: Bp meds with hold parameters.      Problem/Recommendation - 4:  ·  Problem: Depression.  Recommendation: home meds.      Problem/Recommendation - 5:  ·  Problem: Hypercholesteremia.  Recommendation: statin.      Problem/Recommendation - 6:  Problem: CVA (cerebral vascular accident). Recommendation: supportive care. has HHA.     Problem/Recommendation - 7:  ·  Problem: ILANA .  Recommendation: Renal consulted.
63 year old  male no implantable device with PMHx: HTN, DMT2 (not on meds, last A1C in 2016 was 5.6), CAD, MI followed by CABG X 4 (2007), Stent x 2 in 2011, CVA with right side weakness and tremors, HTN, HLD, stage III- IV sacral decubitus presented to [facility] on 8/8/2019 c/o SOB, no chest pain, no dizziness or syncopal episode. CXR showed pulm congestion with prBNP 10k, WBC 18, was placed on BIPAP admitted to CCU for CHF exacerbation diuresed, and now on 3L/NC. Had mild elevation of Troponin 0.38 from 0.36. On admission PE, pt had bilateral crackles with JVD as per record. For possible pnemonia & UTI (positive for 3 organism), a course of ABX (Rocephin and Zithromax) completed on 8/15. EF 10% on this admission compare to last EF 14% by stress test on 8/5/2019 at [facility].     Problem/Recommendation - 1:  Problem: Acute on chronic systolic congestive heart failure with Cardiomyopathy with low EF. Recommendation: Heart Failure team following. Management per them.     Problem/Recommendation - 2:  ·  Problem: Sacral decubitus ulcer, stage III.  Recommendation: Wound care following and plastics consult pending.       Problem/Recommendation - 3:  ·  Problem: Hypertension.  Recommendation: Bp meds with hold parameters. Bp readings fine.      Problem/Recommendation - 4:  ·  Problem: Depression.  Recommendation: home meds.      Problem/Recommendation - 5:  ·  Problem: Hypercholesteremia.  Recommendation: statin.      Problem/Recommendation - 6:  Problem: CVA (cerebral vascular accident). Recommendation: supportive care. Has HHA.     Problem/Recommendation - 7:  ·  Problem: ILANA .  Recommendation: Renal helping.
63 year old  male no implantable device with PMHx: HTN, DMT2 (not on meds, last A1C in 2016 was 5.6), CAD, MI followed by CABG X 4 (2007), Stent x 2 in 2011, CVA with right side weakness and tremors, HTN, HLD, stage III- IV sacral decubitus presented to [facility] on 8/8/2019 c/o SOB, no chest pain, no dizziness or syncopal episode. CXR showed pulm congestion with prBNP 10k, WBC 18, was placed on BIPAP admitted to CCU for CHF exacerbation diuresed, and now on 3L/NC. Had mild elevation of Troponin 0.38 from 0.36. On admission PE, pt had bilateral crackles with JVD as per record. For possible pnemonia & UTI (positive for 3 organism), a course of ABX (Rocephin and Zithromax) completed on 8/15. EF 10% on this admission compare to last EF 14% by stress test on 8/5/2019 at [facility].     Problem/Recommendation - 1:  Problem: Acute on chronic systolic congestive heart failure with Cardiomyopathy with low EF. Recommendation: Heart Failure team following. Management per them. S/P cardiac cath.   TTE noted. < from: TTE with Doppler (w/Cont) (08.20.19 @ 15:15) >  Conclusions:  1. Eccentric left ventricular hypertrophy (dilated left  ventricle with normal relative wall thickness).  2. Endocardial visualization enhanced with intravenous  injection of Ultrasonic Enhancing Agent (Definity). Severe  global left ventricular systolic dysfunction with regional  variation. There appears to be the suggestion of a small  left ventricular apical, mural thrombus. Consider CT or MRI  to confirm finding. Notified JENNIFER Ashton at 7:00 PM  8/20/2019.  3.Normal right ventricular size and function.    < end of copied text >       Problem/Recommendation - 2:  ·  Problem: Sacral decubitus ulcer, stage III.  Recommendation: Wound care following and plastics consult noted.       Problem/Recommendation - 3:  ·  Problem: Hypertension.  Recommendation: Bp meds with hold parameters. Bp readings fine.      Problem/Recommendation - 4:  ·  Problem: Depression.  Recommendation: Home meds.      Problem/Recommendation - 5:  ·  Problem: Hypercholesteremia.  Recommendation: statin.      Problem/Recommendation - 6:  Problem: CVA (cerebral vascular accident). Recommendation: supportive care. Has HHA.     Problem/Recommendation - 7:  ·  Problem: ILANA .  Recommendation: Renal helping.      Problem/Recommendation - 8:  ·  Problem: LV Thrombus  .  Recommendation: Heparin changed to PO Eliquis per Cardiology.     Disposition : Dc planning home since yesterday as Medically stable and cleared by cardiology.
Obese patient with multiple medical problems with a granulating sacral pressure ulcer.   1. No operative intervention at this time  2. Local wound care as per Wound Care team
This is a  62 yo M with ICM, LVEF 10-15%, and severe LV enlargement on recent nuclear stress test who was admitted for cardiac cath. The HF service was consulted to provide medical optimization prior to considering an intervention. He has significant functional limitation due to prior stroke and residual weakness. At this time he appears well compensated from systolic heart failure.
This is a pleasant 62 yo M with ICM, LVEF 10-15%, and severe LV enlargement on recent nuclear stress test who was admitted for cardiac cath. The HF service was consulted to provide medical optimization prior to considering an intervention. He has significant functional limitation due to prior stroke and residual weakness, but he can get around the home using a walker with everything on a single level. He has had a chronic indwelling Pro catheter for a few months according to his sister for "an infection." He has sacral decubiti. He has no overt volume overload.
This is a pleasant 64 yo M with ICM, LVEF 10-15%, and severe LV enlargement on recent nuclear stress test who was admitted for cardiac cath. The HF service was consulted to provide medical optimization prior to considering an intervention. He has significant functional limitation due to prior stroke and residual weakness, but he can get around the home using a walker with everything on a single level. He has had a chronic indwelling Pro catheter for a few months according to his sister for "an infection." He has sacral decubiti. He has no overt volume overload.
This is a pleasant 64 yo M with ICM, LVEF 10-15%, and severe LV enlargement on recent nuclear stress test who was admitted for cardiac cath. The HF service was consulted to provide medical optimization prior to considering an intervention. He has significant functional limitation due to prior stroke and residual weakness, but he can get around the home using a walker with everything on a single level. He has had a chronic indwelling Pro catheter for a few months according to his sister for "an infection." He has sacral decubiti. He has no overt volume overload.
This is a pleasant 62 yo M with ICM, LVEF 10-15%, and severe LV enlargement on recent nuclear stress test who was admitted for cardiac cath. The HF service was consulted to provide medical optimization prior to considering an intervention. He has significant functional limitation due to prior stroke and residual weakness, but he can get around the home using a walker with everything on a single level. He has had a chronic indwelling Pro catheter for a few months according to his sister for "an infection." He has sacral decubiti. He has no overt volume overload.
This is a pleasant 64 yo M with ICM, LVEF 10-15%, and severe LV enlargement on recent nuclear stress test who was admitted for cardiac cath. The HF service was consulted to provide medical optimization prior to considering an intervention. He has significant functional limitation due to prior stroke and residual weakness, but he can get around the home using a walker with everything on a single level. He has had a chronic indwelling Pro catheter for a few months according to his sister for "an infection." He has sacral decubiti. He has no overt volume overload.

## 2019-08-26 NOTE — DISCHARGE NOTE NURSING/CASE MANAGEMENT/SOCIAL WORK - NSDCPEWEB_GEN_ALL_CORE
[facility] website --- [email or web address] --- [email or web address]

## 2019-08-26 NOTE — DISCHARGE NOTE NURSING/CASE MANAGEMENT/SOCIAL WORK - NSDCDPATPORTLINK_GEN_ALL_CORE
You can access the [facility] Portal, offered by [facility], by registering with the following website: [email or web address]

## 2019-08-26 NOTE — PROGRESS NOTE ADULT - PROVIDER SPECIALTY LIST ADULT
Heart Failure
Internal Medicine
Nephrology
Plastic Surgery
Heart Failure

## 2019-08-26 NOTE — PROGRESS NOTE ADULT - REASON FOR ADMISSION
Acute on chronic CHF, LHC
Acute on chronic CHF, LHC
SOB
unknown
sob
sob
Cath Evaluation
Acute on chronic CHF, LHC
For cath evaluation
Acute on chronic CHF, LHC
Acute on chronic CHF, LHC

## 2019-09-09 ENCOUNTER — APPOINTMENT (OUTPATIENT)
Dept: CARDIOLOGY | Facility: CLINIC | Age: 64
End: 2019-09-09
Payer: MEDICARE

## 2019-09-09 VITALS
DIASTOLIC BLOOD PRESSURE: 72 MMHG | WEIGHT: 210 LBS | HEART RATE: 65 BPM | OXYGEN SATURATION: 98 % | SYSTOLIC BLOOD PRESSURE: 115 MMHG | BODY MASS INDEX: 29.73 KG/M2 | HEIGHT: 70.5 IN

## 2019-09-09 VITALS — HEART RATE: 67 BPM | SYSTOLIC BLOOD PRESSURE: 99 MMHG | DIASTOLIC BLOOD PRESSURE: 63 MMHG

## 2019-09-09 LAB
ANION GAP SERPL CALC-SCNC: 11 MMOL/L
BUN SERPL-MCNC: 18 MG/DL
CALCIUM SERPL-MCNC: 9 MG/DL
CHLORIDE SERPL-SCNC: 101 MMOL/L
CO2 SERPL-SCNC: 25 MMOL/L
CREAT SERPL-MCNC: 1.26 MG/DL
GLUCOSE SERPL-MCNC: 79 MG/DL
NT-PROBNP SERPL-MCNC: 1470 PG/ML
POTASSIUM SERPL-SCNC: 3.9 MMOL/L
SODIUM SERPL-SCNC: 137 MMOL/L

## 2019-09-09 PROCEDURE — 99214 OFFICE O/P EST MOD 30 MIN: CPT

## 2019-09-09 RX ORDER — MULTIVIT-MIN/FOLIC/VIT K/LYCOP 400-300MCG
500 TABLET ORAL DAILY
Qty: 30 | Refills: 0 | Status: ACTIVE | COMMUNITY
Start: 2019-09-09

## 2019-09-09 RX ORDER — FUROSEMIDE 20 MG/1
20 TABLET ORAL
Qty: 30 | Refills: 3 | Status: ACTIVE | COMMUNITY
Start: 2019-09-09

## 2019-09-09 RX ORDER — ATORVASTATIN CALCIUM 80 MG/1
80 TABLET, FILM COATED ORAL
Qty: 30 | Refills: 3 | Status: ACTIVE | COMMUNITY
Start: 2019-09-09

## 2019-09-09 RX ORDER — APIXABAN 5 MG/1
5 TABLET, FILM COATED ORAL
Qty: 60 | Refills: 2 | Status: ACTIVE | COMMUNITY
Start: 2019-09-09

## 2019-09-09 RX ORDER — FAMOTIDINE 40 MG/1
40 TABLET, FILM COATED ORAL
Qty: 30 | Refills: 0 | Status: ACTIVE | COMMUNITY
Start: 2019-09-09

## 2019-09-09 RX ORDER — BUPROPION HYDROCHLORIDE 300 MG/1
300 TABLET, EXTENDED RELEASE ORAL DAILY
Refills: 0 | Status: ACTIVE | COMMUNITY
Start: 2018-07-07

## 2019-09-09 RX ORDER — FINASTERIDE 5 MG/1
5 TABLET, FILM COATED ORAL DAILY
Qty: 30 | Refills: 0 | Status: ACTIVE | COMMUNITY
Start: 2019-09-09

## 2019-09-09 NOTE — ASSESSMENT
[FreeTextEntry1] : Mr. Kulkarni is a 63 year old man with chronic systolic heart failure due to a dilated ICM. He has been doing well since discharge, but difficult to assess his activity tolerance due to his sequelae from his remote CVA. He is currently euvolemic on exam and low normotensive on low-moderate dosages of neurohormonal antagonists.

## 2019-09-09 NOTE — DISCUSSION/SUMMARY
[FreeTextEntry1] : 1) Chronic systolic heart failure: Uptitration of GDMT limited by BP and HR without a device. He will continue current dosages of his medications. He will need reassessment of his LVEF in 1 month now that he is on maximally tolerated medical therapy to see if he is a candidate for an ICD.\par 2) Labs today to assess renal function and electrolytes.\par 3) CAD: Continue statin, Plavix, and Toprol  mg daily\par 4) LV thrombus: Continue Eliquis 5 mg BID\par 5) Follow-up with the HF NP in 1 month and with Dr. Saunders in 2 months.

## 2019-09-09 NOTE — PHYSICAL EXAM
vomiting/COUGH [General Appearance - Well Developed] : well developed [General Appearance - Well Nourished] : well nourished [Well Groomed] : well groomed [General Appearance - In No Acute Distress] : no acute distress [No Oral Pallor] : no oral pallor [Normal Conjunctiva] : the conjunctiva exhibited no abnormalities [No Oral Cyanosis] : no oral cyanosis [] : no respiratory distress [Respiration, Rhythm And Depth] : normal respiratory rhythm and effort [Exaggerated Use Of Accessory Muscles For Inspiration] : no accessory muscle use [Heart Rate And Rhythm] : heart rate and rhythm were normal [Auscultation Breath Sounds / Voice Sounds] : lungs were clear to auscultation bilaterally [Heart Sounds] : normal S1 and S2 [Murmurs] : no murmurs present [Bowel Sounds] : normal bowel sounds [Edema] : no peripheral edema present [Arterial Pulses Normal] : the arterial pulses were normal [Abdomen Soft] : soft [Abdomen Tenderness] : non-tender [Nail Clubbing] : no clubbing of the fingernails [Skin Color & Pigmentation] : normal skin color and pigmentation [Cyanosis, Localized] : no localized cyanosis [Skin Turgor] : normal skin turgor [Oriented To Time, Place, And Person] : oriented to person, place, and time [Impaired Insight] : insight and judgment were intact [Affect] : the affect was normal [Mood] : the mood was normal [FreeTextEntry1] : in wheelchair

## 2019-09-09 NOTE — HISTORY OF PRESENT ILLNESS
[FreeTextEntry1] : Mr. Kulkarni is a 63 year old man with a history of chronic systolic heart failure due to an ICM (EF 20%, LVEDD 5.7 cm), CAD, MI followed by CABG x 4 (2007), PCI x 2 in 2011, CVA with residual right sided weakness and tremors, chronic carlton catheter, and stage III- IV sacral decubitus, who presents today for follow-up post hospital discharge.\par \par He presented to [facility] on 8/8/19 c/o SOB. He was diuresed and then transferred to [facility] CCU on 8/16 for cardiac cath. The HF service was consulted to provide medical optimization prior to considering an intervention. S/P RHC and angiogram on 8/22 which showed elevated left sided filling pressures and worsening coronary artery disease, but intervention was deferred to try and optimize medically first and discharged home on 8/26 (only bed weights measured in hospital).\par \par Since discharge he has been doing well. He has significant functional limitation due to prior stroke and residual weakness, but is able to ambulate around his home using the walker without difficulty. He has a hospital bed at home and chronically sleeps with the HOB elevated, but denies PND or cough. He is unable to weigh himself at home, but denies any CP, palpitations, syncope, LH/dizziness, SOB at rest, abdominal discomfort, or LE edema. His appetite and bowel habits are normal. He has a chronic indwelling carlton catheter for a few months according to his sister for "an infection." He has been limiting fluid and sodium in his diet and taking his medications as directed.

## 2019-09-09 NOTE — REASON FOR VISIT
[Heart Failure] : congestive heart failure [Follow-Up - From Hospitalization] : follow-up of a recent hospitalization for [Discharge Date: ___] : Discharge Date: [unfilled] [Other: _____] : [unfilled]

## 2019-09-24 ENCOUNTER — MEDICATION RENEWAL (OUTPATIENT)
Age: 64
End: 2019-09-24

## 2019-09-25 ENCOUNTER — RX RENEWAL (OUTPATIENT)
Age: 64
End: 2019-09-25

## 2019-10-08 ENCOUNTER — APPOINTMENT (OUTPATIENT)
Dept: CARDIOLOGY | Facility: CLINIC | Age: 64
End: 2019-10-08

## 2019-10-11 NOTE — PHYSICAL THERAPY INITIAL EVALUATION ADULT - RANGE OF MOTION EXAMINATION, REHAB EVAL
CC: Sleep apnea    HPI: Pt here for yearly ROSE f/u. She reports things are doing well. She has been extremely compliant with CPAP and never sleeps a night without it. She does have some sleep onset insomnia and takes an hour for her to fall asleep with anxiety over her job. We discussed sleep aids and she is not interested in taking anothr medication at this time. She no longer snores or takes naps. She does not doze off during the day.  Denies any restless leg type symptoms  Denies any rhinitis or nasal or sinus congestion at night.    Past Medical History:   Diagnosis Date   • Mitral valve prolapse    • Sleep apnea        ROS:   Cards: The patient denies chest pain, palpitations  Abdomen: denies abdominal pain, nausea, vomiting, diarrhea  General: Denies nightsweats, chills, fevers, anorexia    Examination of the patient reveals:     Visit Vitals  /70 (BP Location: LUE - Left upper extremity, Patient Position: Sitting)   Pulse 65   Resp 16   Ht 5' 7\" (1.702 m)   Wt 91.2 kg   SpO2 99%   BMI 31.48 kg/m²       GENERAL: alert, is in no apparent distress and is well developed and well nourished  LYMPH NODES: no cervical adenopathy, no supraclavicular adenopathy, no axillary adenopathy  SKIN normal color, normal texture, normal turgor, no skin rashes, no atypical appearing skin lesions and no bruises  EYES: pupils are equal and reactive to light and accomodation extraocular movements are full sclera and conjunctiva are normal lids and lashes are normal  EARS: pinna and external ear is normal bilaterally, external auditory canals are normal and auditory acuity is grossly normal  NOSE: external nose is normal to inspection and no septal deviation  MOUTH/THROAT: tongue is midline and appears normal, oropharynx appears normal, soft palate and uvula are normal and oral mucosa is normal  NECK: neck is supple, no thyromegaly, no anterior cervical adenopathy, no posterior cervical adenopathy and no supraclavicular  adenopathy  LUNGS: On inspection there is no evidence of accessory muscle use. On auscultation lungs are clear to auscultation with normal inspiratory/expiratory sounds, no rales, no rhonchi and no wheezes.  HEART: normal PMI, normal rate and rhythm, no palpable heaves or thrills, S1 and S2 normal, no S3 or S4, no murmurs and no extra heart sounds  ABDOMEN: abdomen is soft, normal active bowel sounds, nontender, without masses, without hepatomegaly, without splenomegaly and no pulsatile masses  NEUROLOGIC: cranial nerves 2 through 12 normal, motor strength normal, gait and station normal, coordination normal  EXTREMITIES: no clubbing, no cyanosis and no edema      Data download: AHI 1.1, % used days > 4 hrs: 99, days not used: 1/90  CPAP @ 7 cmH2O      Assessment:  1. ROSE: well controlled on current CPAP settings, compliant as per Medicare guidelines and benefiting from treatment  2. Hypersomnia: much improved with CPAP  3. Obesity    Plan:  1. Continue CPAP use at current settings 7cm H2O  2. Regular changing of all CPAP supplies  3. Diet and exercise regimen to lose weight  4. Follow up in 1 year with data download    Gianluca Dalal MD     bilateral upper extremity ROM was WFL (within functional limits)/bilateral lower extremity ROM was WFL (within functional limits)/R fingers/wrist min limited

## 2019-10-22 ENCOUNTER — APPOINTMENT (OUTPATIENT)
Dept: CARDIOLOGY | Facility: CLINIC | Age: 64
End: 2019-10-22

## 2019-11-21 ENCOUNTER — NON-APPOINTMENT (OUTPATIENT)
Age: 64
End: 2019-11-21

## 2019-11-21 ENCOUNTER — APPOINTMENT (OUTPATIENT)
Dept: CARDIOLOGY | Facility: CLINIC | Age: 64
End: 2019-11-21
Payer: MEDICARE

## 2019-11-21 VITALS
DIASTOLIC BLOOD PRESSURE: 75 MMHG | HEART RATE: 66 BPM | OXYGEN SATURATION: 98 % | BODY MASS INDEX: 28.63 KG/M2 | HEIGHT: 70 IN | SYSTOLIC BLOOD PRESSURE: 115 MMHG | WEIGHT: 200 LBS

## 2019-11-21 DIAGNOSIS — I51.3 INTRACARDIAC THROMBOSIS, NOT ELSEWHERE CLASSIFIED: ICD-10-CM

## 2019-11-21 PROCEDURE — 93000 ELECTROCARDIOGRAM COMPLETE: CPT

## 2019-11-21 PROCEDURE — 99215 OFFICE O/P EST HI 40 MIN: CPT

## 2019-11-21 PROCEDURE — 99214 OFFICE O/P EST MOD 30 MIN: CPT

## 2019-11-21 NOTE — DISCUSSION/SUMMARY
[FreeTextEntry1] : 1) Chronic systolic heart failure: Increase Entresto to 49/51 mg PO BID. At his next clinic visit, the Entresto should be increased to maximum dose in the absence of dizziness or symptomatic hypotension.  \par 2) CAD: Continue clopidogrel.\par 3) Hyperlipidemia: Continue atorvastatin. \par 4) LV thrombus: Continue Eliquis 5 mg BID\par 5) Follow-up with the HF NP in 1 month and with Dr. Saunders in 2 months.

## 2019-11-21 NOTE — HISTORY OF PRESENT ILLNESS
[FreeTextEntry1] : Mr. Kulkarni is a 63 year old man with a history of chronic systolic heart failure due to an ischemic dilated cardiomyopathy with severely depressed LVEF of 20%. He suffered from a myocardial infarction and underwent CABG x 4 in 2007. He had subsequent PCI x 2 in 2011. His other notable history includes a history of CVA with residual right sided weakness and tremors, chronic Pro catheter, and stage III- IV sacral decubitus. He was recently hospitalized in August with acute on chronic systolic heart failure. He was found to have high biventricular filling pressures and worsening coronary artery disease, but coronary  intervention was deferred.\par \par Since his last clinic visit, he has been doing well. He exercises to a limited capacity at his home and is able to walk in the ortiz way with assistance. He is not dyspneic with exertion and notes no PND, orthopnea, chest pain or pressure, swelling in his legs, increase in abdominal girth, or dizziness. He has not had any falls. He believes that overall he is improving and notes that his sacral wound is healing.

## 2019-11-21 NOTE — ASSESSMENT
[FreeTextEntry1] : Mr. Kulkarni is a 63 year old man with a history of chronic systolic heart failure due to an ischemic dilated cardiomyopathy with severely depressed LVEF of 20%. He suffered from a myocardial infarction and underwent CABG x 4 in 2007. He is currently euvolemic and normotensive. He has no evidence of active ischemia.  His NYHA functional status is difficult to determine.

## 2019-11-21 NOTE — PHYSICAL EXAM
[General Appearance - Well Developed] : well developed [Normal Conjunctiva] : the conjunctiva exhibited no abnormalities [Normal Oral Mucosa] : normal oral mucosa [Respiration, Rhythm And Depth] : normal respiratory rhythm and effort [Auscultation Breath Sounds / Voice Sounds] : lungs were clear to auscultation bilaterally [Heart Rate And Rhythm] : heart rate and rhythm were normal [Heart Sounds] : normal S1 and S2 [Arterial Pulses Normal] : the arterial pulses were normal [Edema] : no peripheral edema present [Bowel Sounds] : normal bowel sounds [Abdomen Soft] : soft [] : no hepato-splenomegaly [FreeTextEntry1] : In wheelchair.  [Nail Clubbing] : no clubbing of the fingernails [Cyanosis, Localized] : no localized cyanosis [Nail Splinter Hemorrhages] : no splinter hemorrhages of the nails [Skin Color & Pigmentation] : normal skin color and pigmentation [Skin Turgor] : normal skin turgor [Oriented To Time, Place, And Person] : oriented to person, place, and time [Impaired Insight] : insight and judgment were intact

## 2019-11-30 PROBLEM — I51.3 LEFT VENTRICULAR THROMBUS: Status: ACTIVE | Noted: 2019-09-09

## 2019-11-30 NOTE — HISTORY OF PRESENT ILLNESS
[FreeTextEntry1] : Isaak is returning to the office for a follow-up\par Prior admission to [facility] for ADHF - EF worsened\par Placed on HF meds - now returning for f/u\par \par No chest pain\par No palpitations\par No LE edema\par No falls or blackouts\par Doing some more exercise and walking\par \par

## 2020-01-29 ENCOUNTER — APPOINTMENT (OUTPATIENT)
Dept: HEART FAILURE | Facility: CLINIC | Age: 65
End: 2020-01-29
Payer: MEDICARE

## 2020-01-29 VITALS
BODY MASS INDEX: 30.49 KG/M2 | WEIGHT: 213 LBS | SYSTOLIC BLOOD PRESSURE: 120 MMHG | OXYGEN SATURATION: 96 % | HEART RATE: 57 BPM | HEIGHT: 70 IN | DIASTOLIC BLOOD PRESSURE: 72 MMHG | RESPIRATION RATE: 12 BRPM

## 2020-01-29 PROCEDURE — 99214 OFFICE O/P EST MOD 30 MIN: CPT

## 2020-01-31 LAB
ANION GAP SERPL CALC-SCNC: 11 MMOL/L
BUN SERPL-MCNC: 23 MG/DL
CALCIUM SERPL-MCNC: 8.5 MG/DL
CHLORIDE SERPL-SCNC: 103 MMOL/L
CO2 SERPL-SCNC: 24 MMOL/L
CREAT SERPL-MCNC: 1.29 MG/DL
GLUCOSE SERPL-MCNC: 74 MG/DL
NT-PROBNP SERPL-MCNC: 1333 PG/ML
POTASSIUM SERPL-SCNC: 4.4 MMOL/L
SODIUM SERPL-SCNC: 138 MMOL/L

## 2020-02-05 RX ORDER — SACUBITRIL AND VALSARTAN 49; 51 MG/1; MG/1
49-51 TABLET, FILM COATED ORAL TWICE DAILY
Qty: 60 | Refills: 3 | Status: DISCONTINUED | COMMUNITY
Start: 2019-09-09 | End: 2020-02-05

## 2020-02-05 NOTE — HISTORY OF PRESENT ILLNESS
[FreeTextEntry1] : Mr. Kulkarni is a 63 year old man with a history of chronic systolic heart failure due to an ischemic dilated cardiomyopathy with severely depressed LVEF of 20%. He suffered from a myocardial infarction and underwent CABG x 4 in 2007. He had subsequent PCI x 2 in 2011. His other notable history includes a history of CVA with residual right sided weakness and tremors, chronic Pro catheter, and stage III- IV sacral decubitus. He was recently hospitalized in August 2019 with acute on chronic systolic heart failure. He was found to have high biventricular filling pressures and worsening coronary artery disease, but coronary  intervention was deferred.\par \par Presents today for routine follow up, states he has been feeling well. He has no sob, orthopnea or PND. He has no chest pain, palpitations, or dizziness. He exercises to a limited capacity at his home and is able to walk in the ortiz way with assistance. He has not had any falls. He has no abdominal discomfort or distention, he has not noted any LE edema. Last visit Entresto was increased to moderate dose, which he appears to have tolerated. His weight is up 13# since last visit. He is eating well, taking Ensure supplements in addition to 3 meals and snacks. \par Labs today K 4.4 BUN 23 SCR 1.29

## 2020-02-05 NOTE — PHYSICAL EXAM
[General Appearance - Well Developed] : well developed [Normal Conjunctiva] : the conjunctiva exhibited no abnormalities [Normal Oral Mucosa] : normal oral mucosa [Auscultation Breath Sounds / Voice Sounds] : lungs were clear to auscultation bilaterally [Respiration, Rhythm And Depth] : normal respiratory rhythm and effort [Heart Sounds] : normal S1 and S2 [Heart Rate And Rhythm] : heart rate and rhythm were normal [Arterial Pulses Normal] : the arterial pulses were normal [Bowel Sounds] : normal bowel sounds [Abdomen Soft] : soft [Nail Clubbing] : no clubbing of the fingernails [] : no hepato-splenomegaly [Cyanosis, Localized] : no localized cyanosis [Nail Splinter Hemorrhages] : no splinter hemorrhages of the nails [Skin Color & Pigmentation] : normal skin color and pigmentation [Skin Turgor] : normal skin turgor [Oriented To Time, Place, And Person] : oriented to person, place, and time [Impaired Insight] : insight and judgment were intact [FreeTextEntry1] : In wheelchair.

## 2020-02-05 NOTE — ASSESSMENT
[FreeTextEntry1] : Mr. Kulkarni is a 63 year old man with a history of chronic systolic heart failure due to an ischemic dilated cardiomyopathy with severely depressed LVEF of 20%. He suffered from a myocardial infarction and underwent CABG x 4 in 2007. He is currently normotensive and near euvolemic, increased weight combination of increased caloric intake and fluids. He has no evidence of active ischemia.  His NYHA functional status is difficult to determine.

## 2020-02-05 NOTE — DISCUSSION/SUMMARY
[FreeTextEntry1] : 1) Chronic systolic heart failure: Increase Entresto to  mg PO BID. Continue current dose of      diuretics for now\par      Limit high calorie snacks and foods. No indication for the Ensure drinks if taking PO foods\par 2) CAD: Continue clopidogrel.\par 3) Hyperlipidemia: Continue atorvastatin. \par 4) LV thrombus: Continue Eliquis 5 mg BID\par 5) Follow-up with Dr. Saunders in 2 months.

## 2020-03-27 ENCOUNTER — APPOINTMENT (OUTPATIENT)
Dept: HEART FAILURE | Facility: CLINIC | Age: 65
End: 2020-03-27
Payer: MEDICARE

## 2020-03-27 ENCOUNTER — APPOINTMENT (OUTPATIENT)
Dept: CARDIOLOGY | Facility: CLINIC | Age: 65
End: 2020-03-27

## 2020-03-27 PROCEDURE — 99441: CPT

## 2020-04-06 ENCOUNTER — RX RENEWAL (OUTPATIENT)
Age: 65
End: 2020-04-06

## 2020-04-30 ENCOUNTER — APPOINTMENT (OUTPATIENT)
Dept: CARDIOLOGY | Facility: CLINIC | Age: 65
End: 2020-04-30
Payer: MEDICARE

## 2020-04-30 PROCEDURE — 99213 OFFICE O/P EST LOW 20 MIN: CPT | Mod: 95

## 2020-05-01 NOTE — PHYSICAL EXAM
[Normal Conjunctiva] : the conjunctiva exhibited no abnormalities [General Appearance - Well Developed] : well developed [General Appearance - Well Nourished] : well nourished [No Oral Pallor] : no oral pallor [Normal Oral Mucosa] : normal oral mucosa [] : no respiratory distress [Exaggerated Use Of Accessory Muscles For Inspiration] : no accessory muscle use [Skin Color & Pigmentation] : normal skin color and pigmentation [Oriented To Time, Place, And Person] : oriented to person, place, and time [Affect] : the affect was normal

## 2020-05-01 NOTE — HISTORY OF PRESENT ILLNESS
[Home] : at home, [unfilled] , at the time of the visit. [Medical Office: ([facility])___] : at the medical office located in  [Other:____] : [unfilled] [Self] : self [Patient] : the patient [FreeTextEntry1] : Isaak is performing a F/u Telehealth visit today\par Notes that his Entresto has been titrated to MAX dose and he is feeling well\par He notes no LE edema or orthopnea\par He has not had chest pain or tightness\par He has not felt palpitations\par He does limited walking in and around his apartment\par No syncope or near syncope\par

## 2020-05-01 NOTE — DISCUSSION/SUMMARY
[FreeTextEntry1] : No changes in meds\par Encouraged more walking/exercise\par Labs to be checked to assess lytes and Cr on higher dose of Entresto\par F/u in office this summer\par Discussed weight loss and HF follow-up\par

## 2020-09-09 ENCOUNTER — APPOINTMENT (OUTPATIENT)
Dept: CARDIOLOGY | Facility: CLINIC | Age: 65
End: 2020-09-09
Payer: MEDICARE

## 2020-09-09 PROCEDURE — 99212 OFFICE O/P EST SF 10 MIN: CPT | Mod: 95

## 2020-10-11 NOTE — REASON FOR VISIT
[Home] : at home, [unfilled] , at the time of the visit. [Medical Office: ([facility])___] : at the medical office located in  [Follow-Up - Clinic] : a clinic follow-up of [Cardiomyopathy] : cardiomyopathy [Coronary Artery Disease] : coronary artery disease [Medication Management] : Medication management [Prior Myocardial Infarction] : a prior myocardial infarction [Verbal consent obtained from patient] : the patient, [unfilled]

## 2020-10-11 NOTE — HISTORY OF PRESENT ILLNESS
[FreeTextEntry1] : Feels well\par Tolerating meds without side effects\par No recent labs\par No CP\par No LE edema\par No palps\par No orthopnea\par Still very sedentary

## 2020-10-11 NOTE — PHYSICAL EXAM
[General Appearance - Well Developed] : well developed [Normal Appearance] : normal appearance [Well Groomed] : well groomed [Normal Conjunctiva] : the conjunctiva exhibited no abnormalities [Normal Oral Mucosa] : normal oral mucosa [] : no respiratory distress [Oriented To Time, Place, And Person] : oriented to person, place, and time [Affect] : the affect was normal [Mood] : the mood was normal [No Anxiety] : not feeling anxious

## 2020-12-07 NOTE — DIETITIAN INITIAL EVALUATION ADULT. - 30 CAL TO
[facility] - [facility]    Progress Note    Earlene Obregon Patient Status:  Inpatient    3/7/1958 MRN [id number]   Location [facility] 4W/SW/SE Attending David Parkinson MD   Hosp Day # 5 PCP Perry Hyde MD       Subjective: 6.88* 2.92* 5.92*   GFRAA 7* 19* 8*   GFRNAA 6* 16* 7*   CA 6.5* 8.0* 6.9*    139 138   K 4.2 2.9* 4.0    100 101   CO2 26.0 32.0 31.0          Xr Abdomen (1 View) (cpt=74018)    Result Date: 12/6/2020  CONCLUSION:  1.  Moderate stool throughout 8622

## 2021-01-11 NOTE — HISTORY OF PRESENT ILLNESS
Adderall XR 25MG er capsules has been rejected by insurance. Dextroamphetamine-Amphetamine 30 Mg Cp24 tier-1 NOT Required    ADDERALL XR 25 MG     FAVORABLE. [FreeTextEntry1] : Isaak is returning to the office for a follow-up\par Recently admitted to [facility] with urosepsis/kidney stone\par Now returns for f/u\par \par No chest pain\par No palpitations\par No LE edema\par No falls or blackouts\par \par

## 2021-02-22 ENCOUNTER — RX RENEWAL (OUTPATIENT)
Age: 66
End: 2021-02-22

## 2021-02-22 RX ORDER — SPIRONOLACTONE 25 MG/1
25 TABLET ORAL DAILY
Qty: 45 | Refills: 1 | Status: ACTIVE | COMMUNITY
Start: 2019-09-24 | End: 1900-01-01

## 2021-05-06 ENCOUNTER — NON-APPOINTMENT (OUTPATIENT)
Age: 66
End: 2021-05-06

## 2021-05-19 NOTE — DISCHARGE NOTE NURSING/CASE MANAGEMENT/SOCIAL WORK - NSDCPEEMAIL_GEN_ALL_CORE
[facility] for Tobacco Control email [email or web address] with one or more new problems requiring additional work-up/treatment.

## 2021-09-30 NOTE — PHYSICAL EXAM
[General Appearance - Well Developed] : well developed [Normal Appearance] : normal appearance [General Appearance - Well Nourished] : well nourished [General Appearance - In No Acute Distress] : no acute distress [Normal Conjunctiva] : the conjunctiva exhibited no abnormalities [Eyelids - No Xanthelasma] : the eyelids demonstrated no xanthelasmas [Normal Oral Mucosa] : normal oral mucosa [No Oral Pallor] : no oral pallor [No Oral Cyanosis] : no oral cyanosis [Normal Jugular Venous A Waves Present] : normal jugular venous A waves present [Normal Jugular Venous V Waves Present] : normal jugular venous V waves present [No Jugular Venous Carrillo A Waves] : no jugular venous carrillo A waves [Respiration, Rhythm And Depth] : normal respiratory rhythm and effort [Exaggerated Use Of Accessory Muscles For Inspiration] : no accessory muscle use [Auscultation Breath Sounds / Voice Sounds] : lungs were clear to auscultation bilaterally [Heart Rate And Rhythm] : heart rate and rhythm were normal [Heart Sounds] : normal S1 and S2 [Murmurs] : no murmurs present [Abdomen Soft] : soft [Abdomen Tenderness] : non-tender [Abdomen Mass (___ Cm)] : no abdominal mass palpated [Nail Clubbing] : no clubbing of the fingernails [Cyanosis, Localized] : no localized cyanosis [Skin Color & Pigmentation] : normal skin color and pigmentation [] : no rash [No Venous Stasis] : no venous stasis [Skin Lesions] : no skin lesions [No Skin Ulcers] : no skin ulcer [No Xanthoma] : no  xanthoma was observed [Oriented To Time, Place, And Person] : oriented to person, place, and time [No Anxiety] : not feeling anxious [FreeTextEntry1] : Sitting in wheelchair  Airborne+Contact precautions

## 2022-08-25 ENCOUNTER — NON-APPOINTMENT (OUTPATIENT)
Age: 67
End: 2022-08-25

## 2022-08-25 ENCOUNTER — APPOINTMENT (OUTPATIENT)
Dept: CARDIOLOGY | Facility: CLINIC | Age: 67
End: 2022-08-25

## 2022-08-25 VITALS
DIASTOLIC BLOOD PRESSURE: 74 MMHG | HEIGHT: 70 IN | OXYGEN SATURATION: 97 % | HEART RATE: 68 BPM | SYSTOLIC BLOOD PRESSURE: 114 MMHG

## 2022-08-25 PROCEDURE — 93000 ELECTROCARDIOGRAM COMPLETE: CPT

## 2022-08-25 PROCEDURE — 99214 OFFICE O/P EST MOD 30 MIN: CPT

## 2022-08-28 NOTE — CARDIOLOGY SUMMARY
[de-identified] : 8/25/22\par Sinus  Rhythm  - occasional ectopic ventricular beat   \par Low voltage in limb leads. \par  -Old anterior infarct. \par  -  Nonspecific T-abnormality. \par \par ABNORMAL \par

## 2022-08-28 NOTE — REASON FOR VISIT
[Cardiac Failure] : cardiac failure [Hyperlipidemia] : hyperlipidemia [Hypertension] : hypertension [Coronary Artery Disease] : coronary artery disease

## 2022-08-28 NOTE — HISTORY OF PRESENT ILLNESS
[FreeTextEntry1] : Isaak is returning to the office for a follow-up\par Prolonged hosp for Urosepsis.\par Now returns - has lost significant weight\par \par No chest pain\par No palpitations\par No LE edema\par No falls or blackouts \par

## 2022-08-28 NOTE — PHYSICAL EXAM
[No Acute Distress] : no acute distress [Frail] : frail [Ill-Appearing] : ill-appearing [Normal Conjunctiva] : normal conjunctiva [Normal Venous Pressure] : normal venous pressure [No Carotid Bruit] : no carotid bruit [Normal S1, S2] : normal S1, S2 [No Murmur] : no murmur [No Rub] : no rub [No Gallop] : no gallop [Clear Lung Fields] : clear lung fields [Good Air Entry] : good air entry [No Respiratory Distress] : no respiratory distress  [Soft] : abdomen soft [Non Tender] : non-tender [No Masses/organomegaly] : no masses/organomegaly [Normal Bowel Sounds] : normal bowel sounds [No Edema] : no edema [No Cyanosis] : no cyanosis [No Clubbing] : no clubbing [No Varicosities] : no varicosities [No Rash] : no rash [No Skin Lesions] : no skin lesions [Moves all extremities] : moves all extremities [No Focal Deficits] : no focal deficits [Normal Speech] : normal speech [Alert and Oriented] : alert and oriented [Normal memory] : normal memory [de-identified] : Sitting in wheelchair

## 2022-12-05 ENCOUNTER — APPOINTMENT (OUTPATIENT)
Dept: CV DIAGNOSITCS | Facility: HOSPITAL | Age: 67
End: 2022-12-05

## 2022-12-14 ENCOUNTER — APPOINTMENT (OUTPATIENT)
Dept: CARDIOLOGY | Facility: CLINIC | Age: 67
End: 2022-12-14

## 2023-02-03 NOTE — PROGRESS NOTE ADULT - ATTENDING COMMENTS
Please call me with questions at [phone].
Please call me with questions at [phone].
Catheterization today showed normal RAP, but high wedge pressure and cardiac output. Possible targets for coronary intervention, but Dr. Padron would like to defer until the patient is better optimized. We will monitor with current changes in therapy.
He will need ongoing optimization of his neurohormonal antagonists. He has no right sided heart failure and is asymptomatic, but he is unable to do much activity given sequelae from his remote CVA, which makes assessment challenging. For now I would continue current therapy. He will need follow-up in the heart failure clinic, scheduled as above.     Please call me with questions at [phone].
Please call me with questions at [phone].
Please call me with questions at [phone].
27-Oct-2022

## 2023-03-22 ENCOUNTER — APPOINTMENT (OUTPATIENT)
Dept: CV DIAGNOSITCS | Facility: HOSPITAL | Age: 68
End: 2023-03-22

## 2023-03-22 ENCOUNTER — OUTPATIENT (OUTPATIENT)
Dept: OUTPATIENT SERVICES | Facility: HOSPITAL | Age: 68
LOS: 1 days | End: 2023-03-22
Payer: MEDICARE

## 2023-03-22 ENCOUNTER — APPOINTMENT (OUTPATIENT)
Dept: CARDIOLOGY | Facility: CLINIC | Age: 68
End: 2023-03-22
Payer: MEDICARE

## 2023-03-22 VITALS
WEIGHT: 213 LBS | SYSTOLIC BLOOD PRESSURE: 117 MMHG | HEART RATE: 88 BPM | OXYGEN SATURATION: 96 % | DIASTOLIC BLOOD PRESSURE: 71 MMHG | BODY MASS INDEX: 29.82 KG/M2 | HEIGHT: 71 IN

## 2023-03-22 DIAGNOSIS — I25.5 ISCHEMIC CARDIOMYOPATHY: ICD-10-CM

## 2023-03-22 DIAGNOSIS — I25.2 OLD MYOCARDIAL INFARCTION: ICD-10-CM

## 2023-03-22 PROCEDURE — 93306 TTE W/DOPPLER COMPLETE: CPT | Mod: 26

## 2023-03-22 PROCEDURE — C8929: CPT

## 2023-03-22 PROCEDURE — 93000 ELECTROCARDIOGRAM COMPLETE: CPT

## 2023-03-22 PROCEDURE — 99214 OFFICE O/P EST MOD 30 MIN: CPT

## 2023-05-18 RX ORDER — METOPROLOL SUCCINATE 50 MG/1
50 TABLET, EXTENDED RELEASE ORAL
Qty: 90 | Refills: 2 | Status: ACTIVE | COMMUNITY
Start: 2019-09-09 | End: 1900-01-01

## 2023-06-18 NOTE — REASON FOR VISIT
[Symptom and Test Evaluation] : symptom and test evaluation [Cardiac Failure] : cardiac failure [Arrhythmia/ECG Abnorrmalities] : arrhythmia/ECG abnormalities [Hyperlipidemia] : hyperlipidemia [Hypertension] : hypertension [Coronary Artery Disease] : coronary artery disease

## 2023-06-18 NOTE — HISTORY OF PRESENT ILLNESS
[FreeTextEntry1] : Isaak is returning to the office for a follow-up  \par \par No chest pain\par No palpitations\par No LE edema\par No falls or blackouts \par Remains very sedentary

## 2023-06-18 NOTE — CARDIOLOGY SUMMARY
[de-identified] : 3/22/23\par Sinus  Rhythm \par -Poor R-wave progression -may be secondary to pulmonary disease   consider old anterior infarct. \par  Rightward P/QRS axis and rotation -possible pulmonary disease. \par \par ABNORMAL \par

## 2023-07-19 RX ORDER — SACUBITRIL AND VALSARTAN 24; 26 MG/1; MG/1
24-26 TABLET, FILM COATED ORAL
Qty: 180 | Refills: 3 | Status: ACTIVE | COMMUNITY
Start: 2020-02-05 | End: 1900-01-01

## 2023-09-26 ENCOUNTER — APPOINTMENT (OUTPATIENT)
Dept: CARDIOLOGY | Facility: CLINIC | Age: 68
End: 2023-09-26
Payer: MEDICARE

## 2023-09-26 VITALS
BODY MASS INDEX: 29.82 KG/M2 | WEIGHT: 213 LBS | OXYGEN SATURATION: 99 % | HEART RATE: 87 BPM | HEIGHT: 71 IN | SYSTOLIC BLOOD PRESSURE: 110 MMHG | DIASTOLIC BLOOD PRESSURE: 73 MMHG

## 2023-09-26 PROCEDURE — 93000 ELECTROCARDIOGRAM COMPLETE: CPT

## 2023-09-26 PROCEDURE — 99214 OFFICE O/P EST MOD 30 MIN: CPT

## 2023-12-24 NOTE — CARDIOLOGY SUMMARY
[de-identified] : 9/26/23 Sinus Rhythm -frequent multiform ectopic ventricular beats  # VECs = 3, # types 2 -Poor R-wave progression -may be secondary to pulmonary disease  consider old anterior infarct. -Nonspecific T-abnormality. Low voltage with rightward P-axis and rotation -possible pulmonary disease.  ABNORMAL

## 2024-01-15 ENCOUNTER — RX RENEWAL (OUTPATIENT)
Age: 69
End: 2024-01-15

## 2024-04-09 ENCOUNTER — APPOINTMENT (OUTPATIENT)
Dept: CARDIOLOGY | Facility: CLINIC | Age: 69
End: 2024-04-09
Payer: MEDICARE

## 2024-04-09 VITALS
OXYGEN SATURATION: 96 % | HEIGHT: 71 IN | SYSTOLIC BLOOD PRESSURE: 95 MMHG | HEART RATE: 95 BPM | DIASTOLIC BLOOD PRESSURE: 60 MMHG

## 2024-04-09 DIAGNOSIS — I25.10 ATHEROSCLEROTIC HEART DISEASE OF NATIVE CORONARY ARTERY W/OUT ANGINA PECTORIS: ICD-10-CM

## 2024-04-09 DIAGNOSIS — I50.22 CHRONIC SYSTOLIC (CONGESTIVE) HEART FAILURE: ICD-10-CM

## 2024-04-09 DIAGNOSIS — E78.5 HYPERLIPIDEMIA, UNSPECIFIED: ICD-10-CM

## 2024-04-09 DIAGNOSIS — I10 ESSENTIAL (PRIMARY) HYPERTENSION: ICD-10-CM

## 2024-04-09 PROCEDURE — 99214 OFFICE O/P EST MOD 30 MIN: CPT

## 2024-04-09 PROCEDURE — 93000 ELECTROCARDIOGRAM COMPLETE: CPT

## 2024-04-15 ENCOUNTER — NON-APPOINTMENT (OUTPATIENT)
Age: 69
End: 2024-04-15

## 2024-04-15 PROBLEM — I50.22 CHRONIC SYSTOLIC HEART FAILURE: Status: ACTIVE | Noted: 2019-09-09

## 2024-04-15 PROBLEM — I25.10 CAD (CORONARY ARTERY DISEASE): Status: ACTIVE | Noted: 2019-09-09

## 2024-04-15 NOTE — HISTORY OF PRESENT ILLNESS
[FreeTextEntry1] : Isaak is returning to the office for a follow-up    No chest pain No palpitations No LE edema No falls or blackouts  Remains very sedentary. Tremor appears worse

## 2024-10-15 ENCOUNTER — APPOINTMENT (OUTPATIENT)
Dept: CARDIOLOGY | Facility: CLINIC | Age: 69
End: 2024-10-15